# Patient Record
Sex: MALE | Race: WHITE | NOT HISPANIC OR LATINO | Employment: UNEMPLOYED | URBAN - METROPOLITAN AREA
[De-identification: names, ages, dates, MRNs, and addresses within clinical notes are randomized per-mention and may not be internally consistent; named-entity substitution may affect disease eponyms.]

---

## 2021-11-26 ENCOUNTER — HOSPITAL ENCOUNTER (EMERGENCY)
Facility: HOSPITAL | Age: 31
Discharge: HOME | End: 2021-11-26
Attending: EMERGENCY MEDICINE
Payer: OTHER GOVERNMENT

## 2021-11-26 ENCOUNTER — APPOINTMENT (EMERGENCY)
Dept: RADIOLOGY | Facility: HOSPITAL | Age: 31
End: 2021-11-26
Payer: OTHER GOVERNMENT

## 2021-11-26 VITALS
BODY MASS INDEX: 25.8 KG/M2 | OXYGEN SATURATION: 96 % | RESPIRATION RATE: 18 BRPM | TEMPERATURE: 96 F | HEART RATE: 67 BPM | SYSTOLIC BLOOD PRESSURE: 126 MMHG | HEIGHT: 71 IN | DIASTOLIC BLOOD PRESSURE: 70 MMHG

## 2021-11-26 DIAGNOSIS — S61.419A HAND LACERATION: Primary | ICD-10-CM

## 2021-11-26 PROCEDURE — 99283 EMERGENCY DEPT VISIT LOW MDM: CPT

## 2021-11-26 PROCEDURE — 99284 EMERGENCY DEPT VISIT MOD MDM: CPT | Performed by: EMERGENCY MEDICINE

## 2021-11-26 PROCEDURE — 73130 X-RAY EXAM OF HAND: CPT

## 2021-11-26 PROCEDURE — 90471 IMMUNIZATION ADMIN: CPT

## 2021-11-26 PROCEDURE — 90715 TDAP VACCINE 7 YRS/> IM: CPT | Performed by: EMERGENCY MEDICINE

## 2021-11-26 RX ORDER — AMOXICILLIN AND CLAVULANATE POTASSIUM 875; 125 MG/1; MG/1
1 TABLET, FILM COATED ORAL ONCE
Status: COMPLETED | OUTPATIENT
Start: 2021-11-26 | End: 2021-11-26

## 2021-11-26 RX ORDER — GINSENG 100 MG
1 CAPSULE ORAL 2 TIMES DAILY
Qty: 28 G | Refills: 0 | Status: SHIPPED | OUTPATIENT
Start: 2021-11-26

## 2021-11-26 RX ORDER — GINSENG 100 MG
1 CAPSULE ORAL ONCE
Status: COMPLETED | OUTPATIENT
Start: 2021-11-26 | End: 2021-11-26

## 2021-11-26 RX ORDER — LIDOCAINE HYDROCHLORIDE 10 MG/ML
10 INJECTION, SOLUTION EPIDURAL; INFILTRATION; INTRACAUDAL; PERINEURAL ONCE
Status: COMPLETED | OUTPATIENT
Start: 2021-11-26 | End: 2021-11-26

## 2021-11-26 RX ORDER — AMOXICILLIN AND CLAVULANATE POTASSIUM 875; 125 MG/1; MG/1
1 TABLET, FILM COATED ORAL EVERY 12 HOURS
Qty: 10 TABLET | Refills: 0 | Status: SHIPPED | OUTPATIENT
Start: 2021-11-26 | End: 2021-12-01

## 2021-11-26 RX ADMIN — LIDOCAINE HYDROCHLORIDE 10 ML: 10 INJECTION, SOLUTION EPIDURAL; INFILTRATION; INTRACAUDAL at 13:56

## 2021-11-26 RX ADMIN — AMOXICILLIN AND CLAVULANATE POTASSIUM 1 TABLET: 875; 125 TABLET, FILM COATED ORAL at 13:44

## 2021-11-26 RX ADMIN — TETANUS TOXOID, REDUCED DIPHTHERIA TOXOID AND ACELLULAR PERTUSSIS VACCINE, ADSORBED 0.5 ML: 5; 2.5; 8; 8; 2.5 SUSPENSION INTRAMUSCULAR at 13:44

## 2021-11-26 RX ADMIN — BACITRACIN 1 SMALL APPLICATION: 500 OINTMENT TOPICAL at 14:49

## 2023-06-10 ENCOUNTER — APPOINTMENT (INPATIENT)
Dept: RADIOLOGY | Facility: HOSPITAL | Age: 33
DRG: 720 | End: 2023-06-10
Payer: COMMERCIAL

## 2023-06-10 ENCOUNTER — APPOINTMENT (INPATIENT)
Dept: NON INVASIVE DIAGNOSTICS | Facility: HOSPITAL | Age: 33
DRG: 720 | End: 2023-06-10
Payer: COMMERCIAL

## 2023-06-10 ENCOUNTER — HOSPITAL ENCOUNTER (INPATIENT)
Facility: HOSPITAL | Age: 33
LOS: 4 days | Discharge: HOME | DRG: 720 | End: 2023-06-14
Attending: EMERGENCY MEDICINE | Admitting: INTERNAL MEDICINE
Payer: COMMERCIAL

## 2023-06-10 ENCOUNTER — APPOINTMENT (EMERGENCY)
Dept: RADIOLOGY | Facility: HOSPITAL | Age: 33
DRG: 720 | End: 2023-06-10
Payer: COMMERCIAL

## 2023-06-10 DIAGNOSIS — I21.4 NSTEMI (NON-ST ELEVATED MYOCARDIAL INFARCTION) (HCC): ICD-10-CM

## 2023-06-10 DIAGNOSIS — F15.10 METHAMPHETAMINE ABUSE (HCC): Primary | ICD-10-CM

## 2023-06-10 DIAGNOSIS — R07.9 CHEST PAIN, UNSPECIFIED TYPE: ICD-10-CM

## 2023-06-10 DIAGNOSIS — E87.8 ELECTROLYTE ABNORMALITY: ICD-10-CM

## 2023-06-10 DIAGNOSIS — R10.9 ABDOMINAL PAIN: ICD-10-CM

## 2023-06-10 DIAGNOSIS — F41.9 ANXIETY: ICD-10-CM

## 2023-06-10 PROBLEM — R73.09 ELEVATED GLUCOSE: Status: ACTIVE | Noted: 2023-06-10

## 2023-06-10 PROBLEM — A41.9 SEPSIS (HCC): Status: ACTIVE | Noted: 2023-06-10

## 2023-06-10 PROBLEM — R74.8 ELEVATED CK: Status: ACTIVE | Noted: 2023-06-10

## 2023-06-10 PROBLEM — K82.8 THICKENING OF WALL OF GALLBLADDER WITH PERICHOLECYSTIC FLUID: Status: ACTIVE | Noted: 2023-06-10

## 2023-06-10 PROBLEM — E87.29 METABOLIC ACIDOSIS, INCREASED ANION GAP (IAG): Status: ACTIVE | Noted: 2023-06-10

## 2023-06-10 PROBLEM — R79.89 ELEVATED SERUM CREATININE: Status: ACTIVE | Noted: 2023-06-10

## 2023-06-10 PROBLEM — R10.84 GENERALIZED ABDOMINAL PAIN: Status: ACTIVE | Noted: 2023-06-10

## 2023-06-10 LAB
2HR DELTA HS TROPONIN: 676 NG/L
4HR DELTA HS TROPONIN: 1551 NG/L
ALBUMIN SERPL BCP-MCNC: 5.1 G/DL (ref 3.5–5)
ALP SERPL-CCNC: 42 U/L (ref 34–104)
ALT SERPL W P-5'-P-CCNC: 31 U/L (ref 7–52)
AMPHETAMINES SERPL QL SCN: POSITIVE
ANION GAP SERPL CALCULATED.3IONS-SCNC: 18 MMOL/L (ref 4–13)
AORTIC ROOT: 3.2 CM
APICAL FOUR CHAMBER EJECTION FRACTION: 50 %
APTT PPP: 28 SECONDS (ref 23–37)
APTT PPP: 35 SECONDS (ref 23–37)
APTT PPP: 59 SECONDS (ref 23–37)
AST SERPL W P-5'-P-CCNC: 45 U/L (ref 13–39)
BACTERIA UR QL AUTO: NORMAL /HPF
BARBITURATES UR QL: NEGATIVE
BASOPHILS # BLD AUTO: 0.04 THOUSANDS/ÂΜL (ref 0–0.1)
BASOPHILS NFR BLD AUTO: 0 % (ref 0–1)
BENZODIAZ UR QL: NEGATIVE
BETA-HYDROXYBUTYRATE: 0.1 MMOL/L
BILIRUB DIRECT SERPL-MCNC: 0.14 MG/DL (ref 0–0.2)
BILIRUB SERPL-MCNC: 0.72 MG/DL (ref 0.2–1)
BILIRUB UR QL STRIP: NEGATIVE
BNP SERPL-MCNC: 330 PG/ML (ref 0–100)
BUN SERPL-MCNC: 25 MG/DL (ref 5–25)
CALCIUM SERPL-MCNC: 9.8 MG/DL (ref 8.4–10.2)
CARDIAC TROPONIN I PNL SERPL HS: 1219 NG/L
CARDIAC TROPONIN I PNL SERPL HS: 2094 NG/L
CARDIAC TROPONIN I PNL SERPL HS: 543 NG/L
CHLORIDE SERPL-SCNC: 95 MMOL/L (ref 96–108)
CHOLEST SERPL-MCNC: 159 MG/DL
CK SERPL-CCNC: 989 U/L (ref 39–308)
CLARITY UR: CLEAR
CO2 SERPL-SCNC: 19 MMOL/L (ref 21–32)
COCAINE UR QL: NEGATIVE
COLOR UR: YELLOW
CREAT SERPL-MCNC: 1.33 MG/DL (ref 0.6–1.3)
D DIMER PPP FEU-MCNC: 0.51 UG/ML FEU
E WAVE DECELERATION TIME: 211 MS
EOSINOPHIL # BLD AUTO: 0.01 THOUSAND/ÂΜL (ref 0–0.61)
EOSINOPHIL NFR BLD AUTO: 0 % (ref 0–6)
ERYTHROCYTE [DISTWIDTH] IN BLOOD BY AUTOMATED COUNT: 11.9 % (ref 11.6–15.1)
ETHANOL SERPL-MCNC: <10 MG/DL
FRACTIONAL SHORTENING: 34 (ref 28–44)
GFR SERPL CREATININE-BSD FRML MDRD: 69 ML/MIN/1.73SQ M
GLUCOSE SERPL-MCNC: 156 MG/DL (ref 65–140)
GLUCOSE UR STRIP-MCNC: NEGATIVE MG/DL
HCT VFR BLD AUTO: 43.5 % (ref 36.5–49.3)
HDLC SERPL-MCNC: 62 MG/DL
HGB BLD-MCNC: 15.4 G/DL (ref 12–17)
HGB UR QL STRIP.AUTO: NEGATIVE
IMM GRANULOCYTES # BLD AUTO: 0.05 THOUSAND/UL (ref 0–0.2)
IMM GRANULOCYTES NFR BLD AUTO: 0 % (ref 0–2)
INR PPP: 1.13 (ref 0.84–1.19)
INTERVENTRICULAR SEPTUM IN DIASTOLE (PARASTERNAL SHORT AXIS VIEW): 0.9 CM
INTERVENTRICULAR SEPTUM: 0.9 CM (ref 0.6–1.1)
KETONES UR STRIP-MCNC: ABNORMAL MG/DL
LAAS-AP2: 15.2 CM2
LAAS-AP4: 13.3 CM2
LACTATE SERPL-SCNC: 1.4 MMOL/L (ref 0.5–2)
LACTATE SERPL-SCNC: 3.8 MMOL/L (ref 0.5–2)
LDLC SERPL CALC-MCNC: 84 MG/DL (ref 0–100)
LEFT ATRIUM SIZE: 3.6 CM
LEFT INTERNAL DIMENSION IN SYSTOLE: 3.5 CM (ref 2.1–4)
LEFT VENTRICULAR INTERNAL DIMENSION IN DIASTOLE: 5.3 CM (ref 3.5–6)
LEFT VENTRICULAR POSTERIOR WALL IN END DIASTOLE: 0.9 CM
LEFT VENTRICULAR STROKE VOLUME: 81 ML
LEUKOCYTE ESTERASE UR QL STRIP: NEGATIVE
LVSV (TEICH): 81 ML
LYMPHOCYTES # BLD AUTO: 1.31 THOUSANDS/ÂΜL (ref 0.6–4.47)
LYMPHOCYTES NFR BLD AUTO: 7 % (ref 14–44)
MAGNESIUM SERPL-MCNC: 1.7 MG/DL (ref 1.9–2.7)
MCH RBC QN AUTO: 31.3 PG (ref 26.8–34.3)
MCHC RBC AUTO-ENTMCNC: 35.4 G/DL (ref 31.4–37.4)
MCV RBC AUTO: 88 FL (ref 82–98)
METHADONE UR QL: NEGATIVE
MONOCYTES # BLD AUTO: 0.96 THOUSAND/ÂΜL (ref 0.17–1.22)
MONOCYTES NFR BLD AUTO: 5 % (ref 4–12)
MV E'TISSUE VEL-SEP: 14 CM/S
MV PEAK A VEL: 0.51 M/S
MV PEAK E VEL: 145 CM/S
MV STENOSIS PRESSURE HALF TIME: 61 MS
MV VALVE AREA P 1/2 METHOD: 3.61
NEUTROPHILS # BLD AUTO: 16.47 THOUSANDS/ÂΜL (ref 1.85–7.62)
NEUTS SEG NFR BLD AUTO: 88 % (ref 43–75)
NITRITE UR QL STRIP: NEGATIVE
NON-SQ EPI CELLS URNS QL MICRO: NORMAL /HPF
NRBC BLD AUTO-RTO: 0 /100 WBCS
OPIATES UR QL SCN: NEGATIVE
OXYCODONE+OXYMORPHONE UR QL SCN: NEGATIVE
PCP UR QL: NEGATIVE
PH UR STRIP.AUTO: 7 [PH]
PLATELET # BLD AUTO: 227 THOUSANDS/UL (ref 149–390)
PMV BLD AUTO: 11.6 FL (ref 8.9–12.7)
POTASSIUM SERPL-SCNC: 2.9 MMOL/L (ref 3.5–5.3)
PROCALCITONIN SERPL-MCNC: <0.05 NG/ML
PROT SERPL-MCNC: 8.1 G/DL (ref 6.4–8.4)
PROT UR STRIP-MCNC: ABNORMAL MG/DL
PROTHROMBIN TIME: 14.6 SECONDS (ref 11.6–14.5)
RBC # BLD AUTO: 4.92 MILLION/UL (ref 3.88–5.62)
RBC #/AREA URNS AUTO: NORMAL /HPF
RIGHT ATRIUM AREA SYSTOLE A4C: 20.8 CM2
RIGHT VENTRICLE ID DIMENSION: 4.7 CM
SL CV LEFT ATRIUM LENGTH A2C: 4.3 CM
SL CV PED ECHO LEFT VENTRICLE DIASTOLIC VOLUME (MOD BIPLANE) 2D: 133 ML
SL CV PED ECHO LEFT VENTRICLE SYSTOLIC VOLUME (MOD BIPLANE) 2D: 52 ML
SODIUM SERPL-SCNC: 132 MMOL/L (ref 135–147)
SP GR UR STRIP.AUTO: 1.01 (ref 1–1.03)
THC UR QL: POSITIVE
TRICUSPID ANNULAR PLANE SYSTOLIC EXCURSION: 3.1 CM
TRIGL SERPL-MCNC: 66 MG/DL
UROBILINOGEN UR QL STRIP.AUTO: 0.2 E.U./DL
WBC # BLD AUTO: 18.84 THOUSAND/UL (ref 4.31–10.16)
WBC #/AREA URNS AUTO: NORMAL /HPF

## 2023-06-10 PROCEDURE — 96361 HYDRATE IV INFUSION ADD-ON: CPT

## 2023-06-10 PROCEDURE — 80307 DRUG TEST PRSMV CHEM ANLYZR: CPT | Performed by: EMERGENCY MEDICINE

## 2023-06-10 PROCEDURE — 82010 KETONE BODYS QUAN: CPT | Performed by: STUDENT IN AN ORGANIZED HEALTH CARE EDUCATION/TRAINING PROGRAM

## 2023-06-10 PROCEDURE — 83880 ASSAY OF NATRIURETIC PEPTIDE: CPT | Performed by: STUDENT IN AN ORGANIZED HEALTH CARE EDUCATION/TRAINING PROGRAM

## 2023-06-10 PROCEDURE — 99222 1ST HOSP IP/OBS MODERATE 55: CPT | Performed by: INTERNAL MEDICINE

## 2023-06-10 PROCEDURE — 85379 FIBRIN DEGRADATION QUANT: CPT | Performed by: EMERGENCY MEDICINE

## 2023-06-10 PROCEDURE — 99222 1ST HOSP IP/OBS MODERATE 55: CPT | Performed by: STUDENT IN AN ORGANIZED HEALTH CARE EDUCATION/TRAINING PROGRAM

## 2023-06-10 PROCEDURE — G1004 CDSM NDSC: HCPCS

## 2023-06-10 PROCEDURE — 85730 THROMBOPLASTIN TIME PARTIAL: CPT | Performed by: STUDENT IN AN ORGANIZED HEALTH CARE EDUCATION/TRAINING PROGRAM

## 2023-06-10 PROCEDURE — 80061 LIPID PANEL: CPT | Performed by: STUDENT IN AN ORGANIZED HEALTH CARE EDUCATION/TRAINING PROGRAM

## 2023-06-10 PROCEDURE — 83605 ASSAY OF LACTIC ACID: CPT | Performed by: STUDENT IN AN ORGANIZED HEALTH CARE EDUCATION/TRAINING PROGRAM

## 2023-06-10 PROCEDURE — 71275 CT ANGIOGRAPHY CHEST: CPT

## 2023-06-10 PROCEDURE — 36415 COLL VENOUS BLD VENIPUNCTURE: CPT | Performed by: EMERGENCY MEDICINE

## 2023-06-10 PROCEDURE — 93005 ELECTROCARDIOGRAM TRACING: CPT

## 2023-06-10 PROCEDURE — 82077 ASSAY SPEC XCP UR&BREATH IA: CPT | Performed by: STUDENT IN AN ORGANIZED HEALTH CARE EDUCATION/TRAINING PROGRAM

## 2023-06-10 PROCEDURE — 83036 HEMOGLOBIN GLYCOSYLATED A1C: CPT | Performed by: STUDENT IN AN ORGANIZED HEALTH CARE EDUCATION/TRAINING PROGRAM

## 2023-06-10 PROCEDURE — 96374 THER/PROPH/DIAG INJ IV PUSH: CPT

## 2023-06-10 PROCEDURE — 74177 CT ABD & PELVIS W/CONTRAST: CPT

## 2023-06-10 PROCEDURE — 81001 URINALYSIS AUTO W/SCOPE: CPT | Performed by: EMERGENCY MEDICINE

## 2023-06-10 PROCEDURE — 99285 EMERGENCY DEPT VISIT HI MDM: CPT

## 2023-06-10 PROCEDURE — 85025 COMPLETE CBC W/AUTO DIFF WBC: CPT | Performed by: EMERGENCY MEDICINE

## 2023-06-10 PROCEDURE — 87040 BLOOD CULTURE FOR BACTERIA: CPT | Performed by: STUDENT IN AN ORGANIZED HEALTH CARE EDUCATION/TRAINING PROGRAM

## 2023-06-10 PROCEDURE — 83735 ASSAY OF MAGNESIUM: CPT | Performed by: NURSE PRACTITIONER

## 2023-06-10 PROCEDURE — 84484 ASSAY OF TROPONIN QUANT: CPT | Performed by: EMERGENCY MEDICINE

## 2023-06-10 PROCEDURE — 82550 ASSAY OF CK (CPK): CPT | Performed by: EMERGENCY MEDICINE

## 2023-06-10 PROCEDURE — 84145 PROCALCITONIN (PCT): CPT | Performed by: STUDENT IN AN ORGANIZED HEALTH CARE EDUCATION/TRAINING PROGRAM

## 2023-06-10 PROCEDURE — 74174 CTA ABD&PLVS W/CONTRAST: CPT

## 2023-06-10 PROCEDURE — 96375 TX/PRO/DX INJ NEW DRUG ADDON: CPT

## 2023-06-10 PROCEDURE — 85730 THROMBOPLASTIN TIME PARTIAL: CPT | Performed by: EMERGENCY MEDICINE

## 2023-06-10 PROCEDURE — 93306 TTE W/DOPPLER COMPLETE: CPT | Performed by: INTERNAL MEDICINE

## 2023-06-10 PROCEDURE — 80074 ACUTE HEPATITIS PANEL: CPT | Performed by: STUDENT IN AN ORGANIZED HEALTH CARE EDUCATION/TRAINING PROGRAM

## 2023-06-10 PROCEDURE — 71045 X-RAY EXAM CHEST 1 VIEW: CPT

## 2023-06-10 PROCEDURE — 93306 TTE W/DOPPLER COMPLETE: CPT

## 2023-06-10 PROCEDURE — 85610 PROTHROMBIN TIME: CPT | Performed by: EMERGENCY MEDICINE

## 2023-06-10 PROCEDURE — 80076 HEPATIC FUNCTION PANEL: CPT | Performed by: NURSE PRACTITIONER

## 2023-06-10 PROCEDURE — 76705 ECHO EXAM OF ABDOMEN: CPT

## 2023-06-10 PROCEDURE — 80048 BASIC METABOLIC PNL TOTAL CA: CPT | Performed by: EMERGENCY MEDICINE

## 2023-06-10 RX ORDER — KETOROLAC TROMETHAMINE 30 MG/ML
15 INJECTION, SOLUTION INTRAMUSCULAR; INTRAVENOUS EVERY 6 HOURS PRN
Status: DISPENSED | OUTPATIENT
Start: 2023-06-10 | End: 2023-06-12

## 2023-06-10 RX ORDER — ASPIRIN 81 MG/1
324 TABLET, CHEWABLE ORAL ONCE
Status: COMPLETED | OUTPATIENT
Start: 2023-06-10 | End: 2023-06-10

## 2023-06-10 RX ORDER — POTASSIUM CHLORIDE 14.9 MG/ML
20 INJECTION INTRAVENOUS ONCE
Status: COMPLETED | OUTPATIENT
Start: 2023-06-10 | End: 2023-06-10

## 2023-06-10 RX ORDER — HEPARIN SODIUM 1000 [USP'U]/ML
2000 INJECTION, SOLUTION INTRAVENOUS; SUBCUTANEOUS EVERY 6 HOURS PRN
Status: DISCONTINUED | OUTPATIENT
Start: 2023-06-10 | End: 2023-06-13

## 2023-06-10 RX ORDER — LANOLIN ALCOHOL/MO/W.PET/CERES
400 CREAM (GRAM) TOPICAL 2 TIMES DAILY
Status: DISCONTINUED | OUTPATIENT
Start: 2023-06-10 | End: 2023-06-14

## 2023-06-10 RX ORDER — ATORVASTATIN CALCIUM 80 MG/1
80 TABLET, FILM COATED ORAL EVERY EVENING
Status: DISCONTINUED | OUTPATIENT
Start: 2023-06-10 | End: 2023-06-14

## 2023-06-10 RX ORDER — HEPARIN SODIUM 1000 [USP'U]/ML
4000 INJECTION, SOLUTION INTRAVENOUS; SUBCUTANEOUS ONCE
Status: COMPLETED | OUTPATIENT
Start: 2023-06-10 | End: 2023-06-10

## 2023-06-10 RX ORDER — HEPARIN SODIUM 10000 [USP'U]/100ML
3-20 INJECTION, SOLUTION INTRAVENOUS
Status: DISCONTINUED | OUTPATIENT
Start: 2023-06-10 | End: 2023-06-13

## 2023-06-10 RX ORDER — MIDAZOLAM HYDROCHLORIDE 2 MG/2ML
1 INJECTION, SOLUTION INTRAMUSCULAR; INTRAVENOUS EVERY 4 HOURS PRN
Status: DISCONTINUED | OUTPATIENT
Start: 2023-06-10 | End: 2023-06-10

## 2023-06-10 RX ORDER — MIDAZOLAM HYDROCHLORIDE 2 MG/2ML
2 INJECTION, SOLUTION INTRAMUSCULAR; INTRAVENOUS ONCE
Status: COMPLETED | OUTPATIENT
Start: 2023-06-10 | End: 2023-06-10

## 2023-06-10 RX ORDER — PANTOPRAZOLE SODIUM 40 MG/1
40 TABLET, DELAYED RELEASE ORAL
Status: DISCONTINUED | OUTPATIENT
Start: 2023-06-11 | End: 2023-06-14

## 2023-06-10 RX ORDER — HEPARIN SODIUM 1000 [USP'U]/ML
4000 INJECTION, SOLUTION INTRAVENOUS; SUBCUTANEOUS EVERY 6 HOURS PRN
Status: DISCONTINUED | OUTPATIENT
Start: 2023-06-10 | End: 2023-06-13

## 2023-06-10 RX ORDER — SODIUM CHLORIDE 9 MG/ML
125 INJECTION, SOLUTION INTRAVENOUS CONTINUOUS
Status: DISCONTINUED | OUTPATIENT
Start: 2023-06-10 | End: 2023-06-13

## 2023-06-10 RX ORDER — LABETALOL HYDROCHLORIDE 5 MG/ML
10 INJECTION, SOLUTION INTRAVENOUS ONCE
Status: DISCONTINUED | OUTPATIENT
Start: 2023-06-10 | End: 2023-06-10

## 2023-06-10 RX ORDER — POLYETHYLENE GLYCOL 3350 17 G/17G
17 POWDER, FOR SOLUTION ORAL DAILY
Status: DISCONTINUED | OUTPATIENT
Start: 2023-06-10 | End: 2023-06-14 | Stop reason: HOSPADM

## 2023-06-10 RX ORDER — POTASSIUM CHLORIDE 20 MEQ/1
20 TABLET, EXTENDED RELEASE ORAL DAILY
Status: DISCONTINUED | OUTPATIENT
Start: 2023-06-11 | End: 2023-06-13

## 2023-06-10 RX ORDER — POTASSIUM CHLORIDE 29.8 MG/ML
40 INJECTION INTRAVENOUS ONCE
Status: DISCONTINUED | OUTPATIENT
Start: 2023-06-10 | End: 2023-06-10

## 2023-06-10 RX ORDER — SODIUM CHLORIDE, SODIUM GLUCONATE, SODIUM ACETATE, POTASSIUM CHLORIDE, MAGNESIUM CHLORIDE, SODIUM PHOSPHATE, DIBASIC, AND POTASSIUM PHOSPHATE .53; .5; .37; .037; .03; .012; .00082 G/100ML; G/100ML; G/100ML; G/100ML; G/100ML; G/100ML; G/100ML
125 INJECTION, SOLUTION INTRAVENOUS CONTINUOUS
Status: DISCONTINUED | OUTPATIENT
Start: 2023-06-10 | End: 2023-06-10

## 2023-06-10 RX ORDER — ACETAMINOPHEN 325 MG/1
650 TABLET ORAL EVERY 6 HOURS PRN
Status: DISCONTINUED | OUTPATIENT
Start: 2023-06-10 | End: 2023-06-10

## 2023-06-10 RX ORDER — METRONIDAZOLE 500 MG/100ML
500 INJECTION, SOLUTION INTRAVENOUS EVERY 8 HOURS
Status: DISCONTINUED | OUTPATIENT
Start: 2023-06-10 | End: 2023-06-12

## 2023-06-10 RX ORDER — NITROGLYCERIN 0.4 MG/1
0.4 TABLET SUBLINGUAL
Status: DISCONTINUED | OUTPATIENT
Start: 2023-06-10 | End: 2023-06-13

## 2023-06-10 RX ORDER — CEFTRIAXONE 1 G/50ML
1000 INJECTION, SOLUTION INTRAVENOUS EVERY 24 HOURS
Status: DISCONTINUED | OUTPATIENT
Start: 2023-06-10 | End: 2023-06-13

## 2023-06-10 RX ORDER — ONDANSETRON 2 MG/ML
4 INJECTION INTRAMUSCULAR; INTRAVENOUS EVERY 6 HOURS PRN
Status: DISCONTINUED | OUTPATIENT
Start: 2023-06-10 | End: 2023-06-14 | Stop reason: HOSPADM

## 2023-06-10 RX ORDER — ASPIRIN 81 MG/1
81 TABLET, CHEWABLE ORAL DAILY
Status: DISCONTINUED | OUTPATIENT
Start: 2023-06-11 | End: 2023-06-14

## 2023-06-10 RX ORDER — FAMOTIDINE 10 MG/ML
20 INJECTION, SOLUTION INTRAVENOUS EVERY 12 HOURS SCHEDULED
Status: DISCONTINUED | OUTPATIENT
Start: 2023-06-10 | End: 2023-06-11

## 2023-06-10 RX ORDER — HYDROXYZINE HYDROCHLORIDE 25 MG/1
25 TABLET, FILM COATED ORAL EVERY 6 HOURS PRN
Status: DISCONTINUED | OUTPATIENT
Start: 2023-06-10 | End: 2023-06-14 | Stop reason: HOSPADM

## 2023-06-10 RX ADMIN — ASPIRIN 81 MG CHEWABLE TABLET 324 MG: 81 TABLET CHEWABLE at 03:30

## 2023-06-10 RX ADMIN — MIDAZOLAM 1 MG: 1 INJECTION INTRAMUSCULAR; INTRAVENOUS at 14:46

## 2023-06-10 RX ADMIN — HEPARIN SODIUM 12 UNITS/KG/HR: 10000 INJECTION, SOLUTION INTRAVENOUS at 06:06

## 2023-06-10 RX ADMIN — FAMOTIDINE 20 MG: 10 INJECTION, SOLUTION INTRAVENOUS at 21:38

## 2023-06-10 RX ADMIN — METRONIDAZOLE 500 MG: 500 INJECTION, SOLUTION INTRAVENOUS at 08:44

## 2023-06-10 RX ADMIN — DILTIAZEM HYDROCHLORIDE 30 MG: 30 TABLET, FILM COATED ORAL at 21:37

## 2023-06-10 RX ADMIN — MAGNESIUM OXIDE TAB 400 MG (241.3 MG ELEMENTAL MG) 400 MG: 400 (241.3 MG) TAB at 18:50

## 2023-06-10 RX ADMIN — SODIUM CHLORIDE 1000 ML: 0.9 INJECTION, SOLUTION INTRAVENOUS at 02:39

## 2023-06-10 RX ADMIN — ATORVASTATIN CALCIUM 80 MG: 80 TABLET, FILM COATED ORAL at 17:24

## 2023-06-10 RX ADMIN — POTASSIUM CHLORIDE 20 MEQ: 14.9 INJECTION, SOLUTION INTRAVENOUS at 08:33

## 2023-06-10 RX ADMIN — NITROGLYCERIN 1 INCH: 20 OINTMENT TOPICAL at 03:31

## 2023-06-10 RX ADMIN — SODIUM CHLORIDE, SODIUM GLUCONATE, SODIUM ACETATE, POTASSIUM CHLORIDE, MAGNESIUM CHLORIDE, SODIUM PHOSPHATE, DIBASIC, AND POTASSIUM PHOSPHATE 125 ML/HR: .53; .5; .37; .037; .03; .012; .00082 INJECTION, SOLUTION INTRAVENOUS at 08:33

## 2023-06-10 RX ADMIN — POTASSIUM CHLORIDE 20 MEQ: 14.9 INJECTION, SOLUTION INTRAVENOUS at 10:38

## 2023-06-10 RX ADMIN — IOHEXOL 100 ML: 350 INJECTION, SOLUTION INTRAVENOUS at 13:23

## 2023-06-10 RX ADMIN — NITROGLYCERIN 0.4 MG: 0.4 TABLET SUBLINGUAL at 14:26

## 2023-06-10 RX ADMIN — DILTIAZEM HYDROCHLORIDE 30 MG: 30 TABLET, FILM COATED ORAL at 13:26

## 2023-06-10 RX ADMIN — HEPARIN SODIUM 4000 UNITS: 1000 INJECTION INTRAVENOUS; SUBCUTANEOUS at 15:31

## 2023-06-10 RX ADMIN — MIDAZOLAM 2 MG: 1 INJECTION INTRAMUSCULAR; INTRAVENOUS at 03:33

## 2023-06-10 RX ADMIN — POLYETHYLENE GLYCOL 3350 17 G: 17 POWDER, FOR SOLUTION ORAL at 10:02

## 2023-06-10 RX ADMIN — HEPARIN SODIUM 2000 UNITS: 1000 INJECTION INTRAVENOUS; SUBCUTANEOUS at 22:14

## 2023-06-10 RX ADMIN — IOHEXOL 100 ML: 350 INJECTION, SOLUTION INTRAVENOUS at 05:41

## 2023-06-10 RX ADMIN — FAMOTIDINE 20 MG: 10 INJECTION, SOLUTION INTRAVENOUS at 08:42

## 2023-06-10 RX ADMIN — CEFTRIAXONE 1000 MG: 1 INJECTION, SOLUTION INTRAVENOUS at 06:50

## 2023-06-10 RX ADMIN — HEPARIN SODIUM 4000 UNITS: 1000 INJECTION INTRAVENOUS; SUBCUTANEOUS at 05:58

## 2023-06-10 RX ADMIN — METRONIDAZOLE 500 MG: 500 INJECTION, SOLUTION INTRAVENOUS at 16:47

## 2023-06-10 NOTE — ASSESSMENT & PLAN NOTE
Evidenced by tachypnea, leukocytosis and possible colitis    · CT abdomen:  · Diffuse gallbladder wall thickening and pericholecystic edema noted without calcified gallstones  Recommend RUQ US for better evaluation  No significant intrahepatic or extrahepatic biliary ductal dilatation  2   Subtle diffuse wall thickening of the stomach, descending  colon, sigmoid colon, and rectum could be related to under  distention versus gastritis/colitis  Recommend correlation  3   No evidence for bowel obstruction, appendicitis, obstructive  uropathy, or free air  4   Small amount of nonspecific pathological free fluid in the  pelvis of uncertain etiology    · Blood cultures-pending  · Pro-[place]am/lactic acid-pending  · Will initiate empiric antibiotic Rocephin  · IVF  · Monitor SSX of infection

## 2023-06-10 NOTE — ED NOTES
Dr Gallo Martin at [facility], [address] [facility]  06/10/23 0105

## 2023-06-10 NOTE — PLAN OF CARE
Problem: Potential for Falls  Goal: Patient will remain free of falls  Description: INTERVENTIONS:  - Educate patient/family on patient safety including physical limitations  - Instruct patient to call for assistance with activity   - Consult OT/PT to assist with strengthening/mobility   - Keep Call bell within reach  - Keep bed low and locked with side rails adjusted as appropriate  - Keep care items and personal belongings within reach  - Initiate and maintain comfort rounds  - Make Fall Risk Sign visible to staff  - Offer Toileting every 2 Hours, in advance of need  - Initiate/Maintain bed alarm  - Obtain necessary fall risk management equipment: yellow socks   Problem: PAIN - ADULT  Goal: Verbalizes/displays adequate comfort level or baseline comfort level  Description: Interventions:  - Encourage patient to monitor pain and request assistance  - Assess pain using appropriate pain scale  - Administer analgesics based on type and severity of pain and evaluate response  - Implement non-pharmacological measures as appropriate and evaluate response  - Consider cultural and social influences on pain and pain management  - Notify physician/advanced practitioner if interventions unsuccessful or patient reports new pain  Outcome: Progressing     Problem: INFECTION - ADULT  Goal: Absence or prevention of progression during hospitalization  Description: INTERVENTIONS:  - Assess and monitor for signs and symptoms of infection  - Monitor lab/diagnostic results  - Monitor all insertion sites, i e  indwelling lines, tubes, and drains  - Monitor endotracheal if appropriate and nasal secretions for changes in amount and color  - [place] appropriate cooling/warming therapies per order  - Administer medications as ordered  - Instruct and encourage patient and family to use good hand hygiene technique  - Identify and instruct in appropriate isolation precautions for identified infection/condition  Outcome: Progressing  Goal: Absence of fever/infection during neutropenic period  Description: INTERVENTIONS:  - Monitor WBC    Outcome: Progressing     - Apply yellow socks and bracelet for high fall risk patients  - Consider moving patient to room near nurses station  Outcome: Progressing

## 2023-06-10 NOTE — ASSESSMENT & PLAN NOTE
· K+-2 9, replenished in ED will continue  · NA-132/CL-95, continue IVF  · Mag-1 7, replenish and monitor  · Trend

## 2023-06-10 NOTE — ASSESSMENT & PLAN NOTE
ACS R/o    Low risk:   /   DOLORES Risk Score    Flowsheet Row Most Recent Value   Age >= 72 0 Filed at: 06/10/2023 4921   Known CAD (stenosis >= 50%) 0 Filed at: 06/10/2023 2090   Recent (<=24 hrs) Service Angina 1 Filed at: 06/10/2023 0921   ST Deviation >= 0 5 mm 0 Filed at: 06/10/2023 2467   3+ CAD Risk Factors (FHx, HTN, HLP, DM, Smoker) 0 Filed at: 06/10/2023 0152   Aspirin Use Past 7 Days 0 Filed at: 06/10/2023 6787   Elevated Cardiac Markers 1 Filed at: 06/10/2023 0774   DOLORES Risk Score (Calculated) 2 Filed at: 06/10/2023 8413      •   • EKG: Sinus bradycardia  • CXR-No acute cardiopulmonary disease  Serial Troponin I  trending   Likely type 2 MI secondary Meth use vs ACS  Echo: Pending  Telemetry  • CBC, CMP- trending   • BNP-pending  • [id number]%,   • Lipid Panel-pending  • Telemetry in place  • Meds: As needed morphine, nitrates, supplemental oxygen  • Cardiology recs:  o Continue aspirin, heparin drip, atorvastatin  o Replace potassium and magnesium  o Follow-up electrolytes, creatinine, potassium and magnesium  o Repeat troponin in a m   o Calcium channel blocker  o Consider CTA of chest and abdomen to rule out dissection

## 2023-06-10 NOTE — ASSESSMENT & PLAN NOTE
PTA with symptoms of chest pain    · UDS-congruent with methamphetamine and THC  · Counseled on cessation  · Contraindicated beta-blockers for symptomatic management

## 2023-06-10 NOTE — ASSESSMENT & PLAN NOTE
· CT abdomen:   · Diffuse gallbladder wall thickening and pericholecystic edema noted without calcified gallstones  Recommend right upper quadrant ultrasound for better evaluation  No significant intrahepatic or extrahepatic biliary ductal dilatation  · 2  Subtle diffuse wall thickening of the stomach, descending colon, sigmoid colon, and rectum could be related to under distention versus gastritis/colitis  · RUQ ultrasound-pending  · Consider GI/surgical consult if positive findings  · Empiric Rocephin  · Toradol for pain  · CTA dissection protocol:  · Trace amount of complex abnormal fluid in the pelvis for a male patient could relate to infectious/inflammatory process or other etiology, correlate clinically  No evidence of intramural hematoma, dissection or aneurysm  · Persistent diffuse gallbladder wall thickening as seen on ultrasound

## 2023-06-10 NOTE — PLAN OF CARE
Problem: Potential for Falls  Goal: Patient will remain free of falls  Description: INTERVENTIONS:  - Educate patient/family on patient safety including physical limitations  - Instruct patient to call for assistance with activity   - Consult OT/PT to assist with strengthening/mobility   - Keep Call bell within reach  - Keep bed low and locked with side rails adjusted as appropriate  - Keep care items and personal belongings within reach  - Initiate and maintain comfort rounds  - Make Fall Risk Sign visible to staff  - Offer Toileting every 2 Hours, in advance of need  - Initiate/Maintain bed alarm  - Obtain necessary fall risk management equipment: slipper socks  - Apply yellow socks and bracelet for high fall risk patients  - Consider moving patient to room near nurses station  Outcome: Progressing     Problem: CARDIOVASCULAR - ADULT  Goal: Maintains optimal cardiac output and hemodynamic stability  Description: INTERVENTIONS:  - Monitor I/O, vital signs and rhythm  - Monitor for S/S and trends of decreased cardiac output  - Administer and titrate ordered vasoactive medications to optimize hemodynamic stability  - Assess quality of pulses, skin color and temperature  - Assess for signs of decreased coronary artery perfusion  - Instruct patient to report change in severity of symptoms  Outcome: Progressing  Goal: Absence of cardiac dysrhythmias or at baseline rhythm  Description: INTERVENTIONS:  - Continuous cardiac monitoring, vital signs, obtain 12 lead EKG if ordered  - Administer antiarrhythmic and heart rate control medications as ordered  - Monitor electrolytes and administer replacement therapy as ordered  Outcome: Progressing     Problem: PAIN - ADULT  Goal: Verbalizes/displays adequate comfort level or baseline comfort level  Description: Interventions:  - Encourage patient to monitor pain and request assistance  - Assess pain using appropriate pain scale  - Administer analgesics based on type and severity of pain and evaluate response  - Implement non-pharmacological measures as appropriate and evaluate response  - Consider cultural and social influences on pain and pain management  - Notify physician/advanced practitioner if interventions unsuccessful or patient reports new pain  Outcome: Progressing     Problem: INFECTION - ADULT  Goal: Absence or prevention of progression during hospitalization  Description: INTERVENTIONS:  - Assess and monitor for signs and symptoms of infection  - Monitor lab/diagnostic results  - Monitor all insertion sites, i e  indwelling lines, tubes, and drains  - Monitor endotracheal if appropriate and nasal secretions for changes in amount and color  - [place] appropriate cooling/warming therapies per order  - Administer medications as ordered  - Instruct and encourage patient and family to use good hand hygiene technique  - Identify and instruct in appropriate isolation precautions for identified infection/condition  Outcome: Progressing  Goal: Absence of fever/infection during neutropenic period  Description: INTERVENTIONS:  - Monitor WBC    Outcome: Progressing     Problem: SAFETY ADULT  Goal: Patient will remain free of falls  Description: INTERVENTIONS:  - Educate patient/family on patient safety including physical limitations  - Instruct patient to call for assistance with activity   - Consult OT/PT to assist with strengthening/mobility   - Keep Call bell within reach  - Keep bed low and locked with side rails adjusted as appropriate  - Keep care items and personal belongings within reach  - Initiate and maintain comfort rounds  - Make Fall Risk Sign visible to staff  - Offer Toileting every 2 Hours, in advance of need  - Initiate/Maintain bed alarm  - Obtain necessary fall risk management equipment: slipper socks  - Apply yellow socks and bracelet for high fall risk patients  - Consider moving patient to room near nurses station  Outcome: Progressing  Goal: Maintain or return to baseline ADL function  Description: INTERVENTIONS:  -  Assess patient's ability to carry out ADLs; assess patient's baseline for ADL function and identify physical deficits which impact ability to perform ADLs (bathing, care of mouth/teeth, toileting, grooming, dressing, etc )  - Assess/evaluate cause of self-care deficits   - Assess range of motion  - Assess patient's mobility; develop plan if impaired  - Assess patient's need for assistive devices and provide as appropriate  - Encourage maximum independence but intervene and supervise when necessary  - Involve family in performance of ADLs  - Assess for home care needs following discharge   - Consider OT consult to assist with ADL evaluation and planning for discharge  - Provide patient education as appropriate  Outcome: Progressing  Goal: Maintains/Returns to pre admission functional level  Description: INTERVENTIONS:  - Perform BMAT or MOVE assessment daily    - Set and communicate daily mobility goal to care team and patient/family/caregiver  - Collaborate with rehabilitation services on mobility goals if consulted  - Perform Range of Motion 3 times a day  - Reposition patient every 2 hours    - Dangle patient 3 times a day  - Stand patient 3 times a day  - Ambulate patient 3 times a day  - Out of bed to chair 3 times a day   - Out of bed for meals 3 times a day  - Out of bed for toileting  - Record patient progress and toleration of activity level   Outcome: Progressing     Problem: DISCHARGE PLANNING  Goal: Discharge to home or other facility with appropriate resources  Description: INTERVENTIONS:  - Identify barriers to discharge w/patient and caregiver  - Arrange for needed discharge resources and transportation as appropriate  - Identify discharge learning needs (meds, wound care, etc )  - Arrange for interpretive services to assist at discharge as needed  - Refer to Case Management Department for coordinating discharge planning if the patient needs post-hospital services based on physician/advanced practitioner order or complex needs related to functional status, cognitive ability, or social support system  Outcome: Progressing

## 2023-06-10 NOTE — ASSESSMENT & PLAN NOTE
· AG-18  · LA- elevated likely due to underlying cell death from cardiac demand, repeat pending  · Beta hydroxybutyrate-pending  · Ethanol-pending  · Continue IVF

## 2023-06-10 NOTE — CONSULTS
Consultation - Cardiology   [facility]     Tamera Harding 27 y o  male MRN: [id number]  : 1990  Unit/Bed#: [address] [place] [address] Encounter: [id number]      ASSESSMENT:  Elevated cardiac troponin following methamphetamine abuse  Type II versus type I MI  Presented with chest pain which has improved  Complains of abdominal discomfort and bloating  No acute ischemic changes on EKG  Elevated at hsTNI, peak so far     History of methamphetamine and pot abuse  Hypokalemia and hypomagnesemia  Elevated creatinine of 1 33  Elevated AST          RECOMMENDATIONS:    Continue aspirin, IV heparin, atorvastatin  Replace potassium and magnesium  Follow-up electrolytes, creatinine, potassium and magnesium  Repeat troponin in a m  Calcium channel blocker  Consider CTA of chest and abdomen to rule out dissection      Thank you for consulting me in the management and care of this patient  Physician Requesting Consult: Aidan Gordon MD    Reason for Consult / Principal Problem: Elevated troponin    Inpatient consult to Cardiology  Consult performed by: Neil Tello MD  Consult ordered by: Aidan Gordon MD          HPI: Tamera Harding is a 27y o  year old male who presents with abdominal and chest pain following use of methamphetamine  His cardiac troponin has been increasing and at 4 hours it is   Patient states that his chest pain has decreased but he has abdominal discomfort and bloating  He had severe hypokalemia and hypomagnesemia  His electrolytes are being replaced    Review of Systems   Respiratory: Positive for shortness of breath  Cardiovascular: Positive for chest pain  Gastrointestinal: Positive for abdominal pain  All other systems reviewed and are negative  Historical Information   History reviewed  No pertinent past medical history  History reviewed  No pertinent surgical history    Social History     Substance and Sexual Activity   Alcohol Use Not "Currently    Comment: drinks occasional throughout week      Social History     Substance and Sexual Activity   Drug Use Yes   • Types: Marijuana, Methamphetamines    Comment: \"once in awile\"     Social History     Tobacco Use   Smoking Status Former   • Packs/day: 1 00   • Years: 10 00   • Total pack years: 10 00   • Types: Cigarettes   Smokeless Tobacco Not on file   Tobacco Comments    refused teaching      Family History:   Family History   Problem Relation Age of Onset   • No Known Problems Mother    • No Known Problems Father    • No Known Problems Sister    • No Known Problems Brother        Meds/Allergies    PTA meds:    Medications Prior to Admission   Medication   • bacitracin topical ointment 500 units/g topical ointment      No Known Allergies    Current Facility-Administered Medications:   •  acetaminophen (TYLENOL) tablet 650 mg, 650 mg, Oral, Q6H PRN, Kassi Espinoza MD  •  [START ON 6/11/2023] aspirin chewable tablet 81 mg, 81 mg, Oral, Daily, Kassi Espinoza MD  •  atorvastatin (LIPITOR) tablet 80 mg, 80 mg, Oral, QPM, Kassi Espinoza MD  •  cefTRIAXone (ROCEPHIN) IVPB (premix in dextrose) 1,000 mg 50 mL, 1,000 mg, Intravenous, Q24H, MYLA Baker, Last Rate: 100 mL/hr at 06/10/23 0650, 1,000 mg at 06/10/23 0650  •  Famotidine (PF) (PEPCID) injection 20 mg, 20 mg, Intravenous, Q12H [facility], MYLA Baker, 20 mg at 06/10/23 0143  •  heparin (porcine) 25,000 units in 0 45% NaCl 250 mL infusion (premix), 3-20 Units/kg/hr (Order-Specific), Intravenous, Titrated, Brendon Mccullough MD, Last Rate: 9 6 mL/hr at 06/10/23 0606, 12 Units/kg/hr at 06/10/23 0606  •  heparin (porcine) injection 2,000 Units, 2,000 Units, Intravenous, Q6H PRN, Brendon Mccullough MD  •  heparin (porcine) injection 4,000 Units, 4,000 Units, Intravenous, Q6H PRN, Brendon Mccullough MD  •  ketorolac (TORADOL) injection 15 mg, 15 mg, Intravenous, Q6H PRN, MYLA Baker  •  metroNIDAZOLE (FLAGYL) IVPB (premix) 500 mg 100 mL, " "500 mg, Intravenous, Q8H, MYLA Baker, Last Rate: 200 mL/hr at 06/10/23 0844, 500 mg at 06/10/23 0844  •  nitroglycerin (NITROSTAT) SL tablet 0 4 mg, 0 4 mg, Sublingual, Q5 Min PRN, Mia Essex, MD  •  ondansetron [facility]) injection 4 mg, 4 mg, Intravenous, Q6H PRN, Mia Essex, MD  •  polyethylene glycol (MIRALAX) packet 17 g, 17 g, Oral, Daily, Mia Essex, MD, 17 g at 06/10/23 1002  •  [START ON 6/11/2023] potassium chloride (K-DUR,KLOR-CON) CR tablet 20 mEq, 20 mEq, Oral, Daily, Mia Essex, MD  •  [COMPLETED] potassium chloride 20 mEq IVPB (premix), 20 mEq, Intravenous, Once, Last Rate: 50 mL/hr at 06/10/23 0833, 20 mEq at 06/10/23 0833 **FOLLOWED BY** potassium chloride 20 mEq IVPB (premix), 20 mEq, Intravenous, Once, MYLA Baker, Last Rate: 50 mL/hr at 06/10/23 1038, 20 mEq at 06/10/23 1038  •  sodium chloride 0 9 % infusion, 75 mL/hr, Intravenous, Continuous, Mia Essex, MD, Held at 06/10/23 1027        Objective:   Vitals: Blood pressure 149/93, pulse 74, temperature 97 6 °F (36 4 °C), resp  rate 20, height 5' 11\" (1 803 m), weight 84 4 kg (186 lb), SpO2 100 %  Body mass index is 25 94 kg/m²    BP Readings from Last 3 Encounters:   06/10/23 149/93   11/26/21 126/70   10/30/16 138/70     Orthostatic Blood Pressures    Flowsheet Row Most Recent Value   Blood Pressure 149/93 filed at 06/10/2023 2885   Patient Position - Orthostatic VS Lying filed at 06/10/2023 0715          Intake/Output Summary (Last 24 hours) at 6/10/2023 1156  Last data filed at 6/10/2023 1567  Gross per 24 hour   Intake 1000 ml   Output 400 ml   Net 600 ml       Invasive Devices     Peripheral Intravenous Line  Duration           Peripheral IV 06/10/23 Left;Lateral Antecubital <1 day    Peripheral IV 06/10/23 Right;Upper Arm <1 day                  Physical Exam:   Physical Exam    Neurologic:  Alert & oriented x 3, no new focal deficits, Not in any acute distress,  Constitutional:  Well developed, well " "nourished, non-toxic appearance   Eyes:  Pupil equal and reacting to light, conjunctiva normal   HENT:  Atraumatic, oropharynx moist, Neck- normal range of motion, no tenderness, supple   Respiratory:  Bilateral air entry, mostly clear to auscultation  Cardiovascular: S1-S2 regular rhythm, tachycardic with a 1/6 systolic murmur  GI:  Soft, nondistended, normal bowel sounds, nontender, no hepatosplenomegaly appreciated  Musculoskeletal:  no tenderness, no deformities     Skin:  Well hydrated, no rash   Lymphatic:  No lymphadenopathy noted   Extremities: No edema     Labs:   Troponins:   Results from last 7 days   Lab Units 06/10/23  0228   CK TOTAL U/L 989*       CBC with diff:   Results from last 7 days   Lab Units 06/10/23  0228   HEMATOCRIT % 43 5   HEMOGLOBIN g/dL 15 4   MCH pg 31 3   MCHC g/dL 35 4   MCV fL 88   MPV fL 11 6   NRBC AUTO /100 WBCs 0   PLATELETS Thousands/uL 227   RBC Million/uL 4 92   RDW % 11 9   WBC Thousand/uL 18 84*       CMP:   Results from last 7 days   Lab Units 06/10/23  0228   ANION GAP mmol/L 18*   ALBUMIN g/dL 5 1*   ALK PHOS U/L 42   ALT U/L 31   AST U/L 45*   BUN mg/dL 25   CALCIUM mg/dL 9 8   CHLORIDE mmol/L 95*   CO2 mmol/L 19*   CREATININE mg/dL 1 33*   EGFR ml/min/1 73sq m 69   GLUCOSE RANDOM mg/dL 156*   POTASSIUM mmol/L 2 9*   SODIUM mmol/L 132*   TOTAL BILIRUBIN mg/dL 0 72   TOTAL PROTEIN g/dL 8 1       Magnesium:   Results from last 7 days   Lab Units 06/10/23  0228   MAGNESIUM mg/dL 1 7*     Coags:   Results from last 7 days   Lab Units 06/10/23  0228   INR  1 13   PTT seconds 28     TSH:      No components found for: \"TSH3\"  Lipid Profile:   Results from last 7 days   Lab Units 06/10/23  0228   CHOLESTEROL mg/dL 159   HDL mg/dL 62   LDL CALC mg/dL 84   TRIGLYCERIDES mg/dL 66     Lipid Profile:   Lab Results   Component Value Date    CHOLESTEROL 159 06/10/2023    HDL 62 06/10/2023    LDLCALC 84 06/10/2023    TRIG 66 06/10/2023     Hgb A1c:     NT-proBNP: No results for " "input(s): \"NTBNP\" in the last 72 hours  Imaging & Testing   Cardiac testing:   TTE, EF 50-55%      Imaging:   US right upper quadrant    Result Date: 6/10/2023  Narrative: RIGHT UPPER QUADRANT ULTRASOUND INDICATION:     r/o jovana  COMPARISON:  None TECHNIQUE:   Real-time ultrasound of the right upper quadrant was performed with a curvilinear transducer with both volumetric sweeps and still imaging techniques  FINDINGS: PANCREAS:  Visualized portions of the pancreas are within normal limits  AORTA AND IVC:  Visualized portions are normal for patient age  LIVER: Size:  Within normal range  The liver measures 15 5 cm in the midclavicular line  Contour:  Surface contour is smooth  Parenchyma:  Echogenicity and echotexture are within normal limits  No liver mass identified  Limited imaging of the main portal vein shows it to be patent and hepatopetal  BILIARY: Diffuse gallbladder wall thickening measuring 11 mm  Lavaun Kim sign is negative, however patient is medicated  No intrahepatic biliary dilatation  CBD measures 5 0 mm  No choledocholithiasis  KIDNEY: Right kidney measures 11 8 x 5 4 x 5 0 cm  Volume 168 3 mL Kidney within normal limits  ASCITES:   None  Impression: Diffuse gallbladder wall thickening similar to CT  Ahmadi's sign is negative as patient is medicated  Acute cholecystitis is still in the differential and further management based on clinical criteria  Workstation performed: [id number]     XR chest 1 view portable    Result Date: 6/10/2023  Narrative: CHEST INDICATION:   chest pain sob  COMPARISON: 3/5/2016 CT EXAM PERFORMED/VIEWS:  XR CHEST PORTABLE Single view FINDINGS: Cardiomediastinal silhouette appears unremarkable  The lungs are clear  No pneumothorax or pleural effusion  Osseous structures appear within normal limits for patient age  Impression: No acute cardiopulmonary disease   Findings are stable Workstation performed: [id number]     CT abdomen pelvis with contrast    Result Date: " 6/10/2023  Narrative: CT ABDOMEN AND PELVIS WITH IV CONTRAST INDICATION:   r/o appendicitis, low abd pain, also on meth and can't provide hx  COMPARISON: 3/5/2016  TECHNIQUE:  CT examination of the abdomen and pelvis was performed  Multiplanar 2D reformatted images were created from the source data  This examination, like all CT scans performed in the [facility], was performed utilizing techniques to minimize radiation dose exposure, including the use of iterative reconstruction and automated exposure control  Radiation dose length product (DLP) for this visit:  484 mGy-cm IV Contrast:  100 mL of iohexol (OMNIPAQUE) Enteric Contrast:  Enteric contrast was not administered  FINDINGS: Exam limited by motion artifact  ABDOMEN LOWER CHEST:  No clinically significant abnormality identified in the visualized lower chest  LIVER/BILIARY TREE:  Unremarkable  GALLBLADDER: No calcified gallstones  Diffuse gallbladder wall thickening and pericholecystic edema noted  Recommend right upper quadrant ultrasound for better evaluation  No significant intrahepatic or extrahepatic biliary ductal dilatation  SPLEEN:  Unremarkable  PANCREAS:  Unremarkable  ADRENAL GLANDS:  Unremarkable  KIDNEYS/URETERS:  Unremarkable  No hydronephrosis  STOMACH AND BOWEL: Stomach is mildly distended with air and fluid  No intraluminal mass seen  Subtle diffuse wall thickening of the stomach could be due to incomplete distention or nonspecific gastritis  Small and large bowel loops appear normal in course and caliber without evidence for obstruction  Terminal ileum and appendix are unremarkable  Left colon is predominantly contracted limiting evaluation  Wall thickening of the descending and sigmoid colon could be due to under distention or focal colitis  No pericolonic free air or abscess/fluid collection  APPENDIX:  No findings to suggest appendicitis   ABDOMINOPELVIC CAVITY: Small amount of nonspecific lower pelvic free fluid noted which is pathologic in a male patient  No pneumoperitoneum  No lymphadenopathy  VESSELS:  Unremarkable for patient's age  PELVIS REPRODUCTIVE ORGANS:  Unremarkable for patient's age  URINARY BLADDER: Moderately distended and grossly unremarkable    ABDOMINAL WALL/INGUINAL REGIONS:  Unremarkable  OSSEOUS STRUCTURES:  No acute fracture or destructive osseous lesion  Impression: 1  Diffuse gallbladder wall thickening and pericholecystic edema noted without calcified gallstones  Recommend right upper quadrant ultrasound for better evaluation  No significant intrahepatic or extrahepatic biliary ductal dilatation  2   Subtle diffuse wall thickening of the stomach, descending colon, sigmoid colon, and rectum could be related to under distention versus gastritis/colitis  Recommend clinical correlation  3   No evidence for bowel obstruction, appendicitis, obstructive uropathy, or free air  4   Small amount of nonspecific pathological free fluid in the pelvis of uncertain etiology   Workstation performed: [id number]     EKG: Sinus rhythm with marked sinus arrhythmia  Monitor: Sinus tachycardia at 127/min       Medications/ Allergies:     Current Facility-Administered Medications   Medication Dose Route Frequency Provider Last Rate   • acetaminophen  650 mg Oral Q6H PRN Aidan Gordon MD     • [START ON 6/11/2023] aspirin  81 mg Oral Daily Aidan Gordon MD     • atorvastatin  80 mg Oral QPM Aidan Gordon MD     • cefTRIAXone  1,000 mg Intravenous Q24H MYLA Baker 1,000 mg (06/10/23 0650)   • famotidine  20 mg Intravenous Q12H [address] MYLA Baker     • heparin (porcine)  3-20 Units/kg/hr (Order-Specific) Intravenous Titrated Delano Paige MD 12 Units/kg/hr (06/10/23 0606)   • heparin (porcine)  2,000 Units Intravenous Q6H PRN Delano Paige MD     • heparin (porcine)  4,000 Units Intravenous Q6H PRN Delano Paige MD     • ketorolac  15 mg Intravenous Q6H PRN MYLA Worthington     • "metroNIDAZOLE  500 mg Intravenous Q8H MYLA Baker 500 mg (06/10/23 0844)   • nitroglycerin  0 4 mg Sublingual Q5 Min PRN Wanda Christie MD     • ondansetron  4 mg Intravenous Q6H PRN Wanda Christie MD     • polyethylene glycol  17 g Oral Daily Wanda Christie MD     • [START ON 6/11/2023] potassium chloride  20 mEq Oral Daily Wanda Christie MD     • potassium chloride  20 mEq Intravenous Once MYLA Baker 20 mEq (06/10/23 1038)   • sodium chloride  75 mL/hr Intravenous Continuous Wanda Christie MD Stopped (06/10/23 1027)     acetaminophen, 650 mg, Q6H PRN  heparin (porcine), 2,000 Units, Q6H PRN  heparin (porcine), 4,000 Units, Q6H PRN  ketorolac, 15 mg, Q6H PRN  nitroglycerin, 0 4 mg, Q5 Min PRN  ondansetron, 4 mg, Q6H PRN      No Known Allergies    Code Status: Level 1 - Full Code  Advance Directive and Living Will:      POLST:        Gabriela Salinas MD, [facility]      \"This note has been constructed using a voice recognition system  Therefore there may be syntax, spelling, and/or grammatical errors  Please call if you have any questions   \"  "

## 2023-06-10 NOTE — ED NOTES
Attempted to ready patient for transfer, patient in bathroom at this time          Wilbur Cuenca RN  06/10/23 5441

## 2023-06-10 NOTE — H&P
[facility]  64   H&P  Name: Julian Patel 27 y o  male I MRN: [id number]  Unit/Bed#: [address] [place] [address] I Date of Admission: 6/10/2023   Date of Service: 6/10/2023 I Hospital Day: 0      Assessment/Plan   Chest pain  Assessment & Plan  ACS R/o    Low risk:   /   DOLORES Risk Score    Flowsheet Row Most Recent Value   Age >= 72 0 Filed at: 06/10/2023 2139   Known CAD (stenosis >= 50%) 0 Filed at: 06/10/2023 5900   Recent (<=24 hrs) Service Angina 1 Filed at: 06/10/2023 0921   ST Deviation >= 0 5 mm 0 Filed at: 06/10/2023 2504   3+ CAD Risk Factors (FHx, HTN, HLP, DM, Smoker) 0 Filed at: 06/10/2023 7183   Aspirin Use Past 7 Days 0 Filed at: 06/10/2023 5468   Elevated Cardiac Markers 1 Filed at: 06/10/2023 7244   DOLORES Risk Score (Calculated) 2 Filed at: 06/10/2023 6856      •   • EKG: Sinus bradycardia  • CXR-No acute cardiopulmonary disease  Serial Troponin I  trending   Likely type 2 MI secondary Meth use vs ACS  Echo: Pending  Telemetry  • CBC, CMP- trending   • BNP-pending  • FwS5l-bascmdc%,   • Lipid Panel-pending  • Telemetry in place  • Meds: As needed morphine, nitrates, supplemental oxygen  • Cardiology recs:  o Continue aspirin, heparin drip, atorvastatin  o Replace potassium and magnesium  o Follow-up electrolytes, creatinine, potassium and magnesium  o Repeat troponin in a m   o Calcium channel blocker  o Consider CTA of chest and abdomen to rule out dissection                 Generalized abdominal pain  Assessment & Plan  · CT abdomen:   · Diffuse gallbladder wall thickening and pericholecystic edema noted without calcified gallstones  Recommend right upper quadrant ultrasound for better evaluation  No significant intrahepatic or extrahepatic biliary ductal dilatation  · 2  Subtle diffuse wall thickening of the stomach, descending colon, sigmoid colon, and rectum could be related to under distention versus gastritis/colitis     · RUQ ultrasound-pending  · Consider GI/surgical consult if positive findings  · Empiric Rocephin  · CTA dissection protocol:  · Trace amount of complex abnormal fluid in the pelvis for a male patient could relate to infectious/inflammatory process or other etiology, correlate clinically  No evidence of intramural hematoma, dissection or aneurysm  · Persistent diffuse gallbladder wall thickening as seen on ultrasound  Sepsis ([facility] )  Assessment & Plan  Evidenced by tachypnea, leukocytosis and possible colitis    · CT abdomen:  · Diffuse gallbladder wall thickening and pericholecystic edema noted without calcified gallstones  Recommend RUQ US for better evaluation  No significant intrahepatic or extrahepatic biliary ductal dilatation  2   Subtle diffuse wall thickening of the stomach, descending  colon, sigmoid colon, and rectum could be related to under  distention versus gastritis/colitis  Recommend correlation  3   No evidence for bowel obstruction, appendicitis, obstructive  uropathy, or free air  4   Small amount of nonspecific pathological free fluid in the  pelvis of uncertain etiology    · Blood cultures-pending  · Pro-Nam/lactic acid-pending  · Will initiate empiric antibiotic Rocephin  · IVF  · Monitor SSX of infection          Methamphetamine use ([facility] )  Assessment & Plan  PTA with symptoms of chest pain    · UDS-congruent with methamphetamine and THC  · Counseled on cessation  · Contraindicated beta-blockers for symptomatic management    Elevated glucose  Assessment & Plan  · POC-156  · [id number]  · Will initiate SSI if needed    Metabolic acidosis, increased anion gap (IAG)  Assessment & Plan  · AG-18  · LA- elevated likely due to underlying cell death from cardiac demand, repeat pending  · Beta hydroxybutyrate-pending  · Ethanol-pending  · Continue IVF    Elevated serum creatinine  Assessment & Plan  · CR-1 33  · Avoid nephrotoxic's  · We will monitor    Electrolyte abnormality  Assessment & Plan  · K+-2 9, replenished in ED will continue  · NA-132/CL-95, continue IVF  · Mag-1 7, replenish and monitor  · Trend    Elevated CK  Assessment & Plan  · CK-989  · Likely secondary to primary admission diagnosis  · We will monitor  · Continue IVF    Thickening of wall of gallbladder with pericholecystic fluid  Assessment & Plan  · CT abdomen:Diffuse gallbladder wall thickening and pericholecystic edema noted without calcified gallstones  Recommend right upper quadrant ultrasound for better evaluation  No significant intrahepatic or extrahepatic biliary ductal dilatation  · RUQ US-pending  · Hepatitis panel-pending       VTE Pharmacologic Prophylaxis:   Moderate Risk (Score 3-4) - Pharmacological DVT Prophylaxis Ordered: heparin drip  Code Status: Level 1 - Full Code   Discussion with family: Patient declined call to   Anticipated Length of Stay: Patient will be admitted on an inpatient basis with an anticipated length of stay of greater than 2 midnights secondary to Clinical course and specialist recommendations  Total Time Spent on Date of Encounter in care of patient: 55 minutes This time was spent on one or more of the following: performing physical exam; counseling and coordination of care; obtaining or reviewing history; documenting in the medical record; reviewing/ordering tests, medications or procedures; communicating with other healthcare professionals and discussing with patient's family/caregivers  Chief Complaint: Chest pain    History of Present Illness:  Ila Tapia is a 27 y o  male with a PMH of substance abuse who presents with chest pain  Patient reported that he recently recent labs yesterday and did meth last night, patient reported last dose of meth was in 2020  Patient reported going to drug rehab in the past   Patient reported chest pain 30 minutes after consuming meth    Patient reported currently having chest pressure localized over left chest wall, numbness in his fingers and left-sided abdominal pain  Patient reported that he cannot pee and is having problems with bowel movements yesterday patient reported associated pain with urination  Patient denied any discharge  Patient's girlfriend at bedside  Nursing reported having difficulty obtaining labs from patient  Review of Systems:  Review of Systems   Constitutional: Negative for chills and fever  HENT: Negative for ear pain and sore throat  Eyes: Negative for pain and visual disturbance  Respiratory: Negative for cough and shortness of breath  Cardiovascular: Positive for chest pain  Negative for palpitations and leg swelling  Gastrointestinal: Positive for abdominal pain  Negative for nausea and vomiting  Genitourinary: Positive for difficulty urinating and dysuria  Negative for hematuria  Musculoskeletal: Negative for arthralgias and back pain  Skin: Negative for color change and rash  Neurological: Negative for seizures and syncope  All other systems reviewed and are negative  Past Medical and Surgical History:   History reviewed  No pertinent past medical history  History reviewed  No pertinent surgical history  Meds/Allergies:  Prior to Admission medications    Medication Sig Start Date End Date Taking? Authorizing Provider   bacitracin topical ointment 500 units/g topical ointment Apply 1 large application topically 2 (two) times a day 11/26/21   Rani Christianson DO     I have reviewed home medications using recent Epic encounter      Allergies: No Known Allergies    Social History:  Marital Status: Single   Occupation: Unknown  Patient Pre-hospital Living Situation: Home  Patient Pre-hospital Level of Mobility: walks  Patient Pre-hospital Diet Restrictions:   Substance Use History:   Social History     Substance and Sexual Activity   Alcohol Use Not Currently    Comment: drinks occasional throughout week      Social History     Tobacco Use   Smoking Status Former   • Packs/day: 1 00   • Years: 10 00 "  • Total pack years: 10 00   • Types: Cigarettes   Smokeless Tobacco Not on file   Tobacco Comments    refused teaching      Social History     Substance and Sexual Activity   Drug Use Yes   • Types: Marijuana, Methamphetamines    Comment: \"once in awile\"       Family History:  Family History   Problem Relation Age of Onset   • No Known Problems Mother    • No Known Problems Father    • No Known Problems Sister    • No Known Problems Brother        Physical Exam:     Vitals:   Blood Pressure: 130/88 (06/10/23 1527)  Pulse: 68 (06/10/23 1527)  Temperature: 97 9 °F (36 6 °C) (06/10/23 1527)  Temp Source: Tympanic (06/10/23 0242)  Respirations: 18 (06/10/23 1527)  Height: 5' 11\" (180 3 cm) (06/10/23 0842)  Weight - Scale: 84 4 kg (186 lb) (06/10/23 0842)  SpO2: 100 % (06/10/23 1527)    Physical Exam  Vitals and nursing note reviewed  Constitutional:       General: He is not in acute distress  Appearance: He is well-developed  HENT:      Head: Normocephalic and atraumatic  Eyes:      Conjunctiva/sclera: Conjunctivae normal    Cardiovascular:      Rate and Rhythm: Normal rate and regular rhythm  Heart sounds: No murmur heard  Pulmonary:      Effort: Pulmonary effort is normal  No respiratory distress  Breath sounds: Normal breath sounds  No wheezing or rhonchi  Abdominal:      General: There is no distension  Palpations: Abdomen is soft  Tenderness: There is abdominal tenderness  There is rebound  There is no guarding  Musculoskeletal:         General: No swelling  Cervical back: Neck supple  Skin:     General: Skin is warm and dry  Capillary Refill: Capillary refill takes less than 2 seconds  Neurological:      Mental Status: He is alert  Psychiatric:         Mood and Affect: Mood normal          Behavior: Behavior normal          Thought Content:  Thought content normal          Additional Data:     Lab Results:  Results from last 7 days   Lab Units 06/10/23  0228 " EOS PCT % 0   HEMATOCRIT % 43 5   HEMOGLOBIN g/dL 15 4   LYMPHS PCT % 7*   MONOS PCT % 5   NEUTROS PCT % 88*   PLATELETS Thousands/uL 227   WBC Thousand/uL 18 84*     Results from last 7 days   Lab Units 06/10/23  0228   ANION GAP mmol/L 18*   ALBUMIN g/dL 5 1*   ALK PHOS U/L 42   ALT U/L 31   AST U/L 45*   BUN mg/dL 25   CALCIUM mg/dL 9 8   CHLORIDE mmol/L 95*   CO2 mmol/L 19*   CREATININE mg/dL 1 33*   GLUCOSE RANDOM mg/dL 156*   POTASSIUM mmol/L 2 9*   SODIUM mmol/L 132*   TOTAL BILIRUBIN mg/dL 0 72     Results from last 7 days   Lab Units 06/10/23  0228   INR  1 13             Results from last 7 days   Lab Units 06/10/23  1409 06/10/23  0228   LACTIC ACID mmol/L 3 8*  --    PROCALCITONIN ng/ml  --  <0 05       Lines/Drains:  Invasive Devices     Peripheral Intravenous Line  Duration           Peripheral IV 06/10/23 Left;Lateral Antecubital <1 day    Peripheral IV 06/10/23 Right;Upper Arm <1 day                    Imaging: Reviewed radiology reports from this admission including: chest xray  CTA dissection protocol chest/abdomen/pelvis   Final Result by Thai Ortiz MD (06/10 6784)      Trace amount of complex abnormal fluid in the pelvis for a male patient could relate to infectious/inflammatory process or other etiology, correlate clinically  No evidence of intramural hematoma, dissection or aneurysm  Persistent diffuse gallbladder wall thickening as seen on ultrasound  Workstation performed: [id number]         US right upper quadrant   Final Result by Yolis Oliveros MD (06/10 4391)      Diffuse gallbladder wall thickening similar to CT  Ahmadi's sign is negative as patient is medicated  Acute cholecystitis is still in the differential and further management based on clinical criteria  Workstation performed: [id number]         CT abdomen pelvis with contrast   Final Result by Maricarmen Perez MD (06/10 1103)      1    Diffuse gallbladder wall thickening and pericholecystic edema noted without calcified gallstones  Recommend right upper quadrant ultrasound for better evaluation  No significant intrahepatic or extrahepatic biliary ductal dilatation  2   Subtle diffuse wall thickening of the stomach, descending colon, sigmoid colon, and rectum could be related to under distention versus gastritis/colitis  Recommend clinical correlation  3   No evidence for bowel obstruction, appendicitis, obstructive uropathy, or free air  4   Small amount of nonspecific pathological free fluid in the pelvis of uncertain etiology  Workstation performed: [id number]         XR chest 1 view portable   Final Result by Sheri Garcia MD (06/10 1075)      No acute cardiopulmonary disease  Findings are stable            Workstation performed: [id number]             EKG and Other Studies Reviewed on Admission:   · EKG: Sinus Bradycardia  HR 56     ** Please Note: This note has been constructed using a voice recognition system   **

## 2023-06-10 NOTE — ASSESSMENT & PLAN NOTE
· CT abdomen:Diffuse gallbladder wall thickening and pericholecystic edema noted without calcified gallstones  Recommend right upper quadrant ultrasound for better evaluation  No significant intrahepatic or extrahepatic biliary ductal dilatation    · RUQ US-pending  · Hepatitis panel-pending

## 2023-06-10 NOTE — ED NOTES
Repeat EKG completed with 2 hr troponin        Goyo [place] [facility]  06/10/23 1079

## 2023-06-10 NOTE — ED NOTES
X-ray at bedside  Patient on continuous cardiac monitor, continuous pulse oximetry, and vital signs Q30        [facility]  06/10/23 9964

## 2023-06-11 ENCOUNTER — APPOINTMENT (INPATIENT)
Dept: RADIOLOGY | Facility: HOSPITAL | Age: 33
DRG: 720 | End: 2023-06-11
Payer: COMMERCIAL

## 2023-06-11 PROBLEM — R10.84 GENERALIZED ABDOMINAL PAIN: Status: RESOLVED | Noted: 2023-06-10 | Resolved: 2023-06-11

## 2023-06-11 PROBLEM — E87.29 METABOLIC ACIDOSIS, INCREASED ANION GAP (IAG): Status: RESOLVED | Noted: 2023-06-10 | Resolved: 2023-06-11

## 2023-06-11 PROBLEM — R79.89 ELEVATED SERUM CREATININE: Status: RESOLVED | Noted: 2023-06-10 | Resolved: 2023-06-11

## 2023-06-11 LAB
ALBUMIN SERPL BCP-MCNC: 4.3 G/DL (ref 3.5–5)
ALP SERPL-CCNC: 44 U/L (ref 34–104)
ALT SERPL W P-5'-P-CCNC: 26 U/L (ref 7–52)
ANION GAP SERPL CALCULATED.3IONS-SCNC: 10 MMOL/L (ref 4–13)
APTT PPP: 88 SECONDS (ref 23–37)
APTT PPP: 88 SECONDS (ref 23–37)
AST SERPL W P-5'-P-CCNC: 41 U/L (ref 13–39)
BILIRUB SERPL-MCNC: 1.44 MG/DL (ref 0.2–1)
BUN SERPL-MCNC: 13 MG/DL (ref 5–25)
CALCIUM SERPL-MCNC: 8.9 MG/DL (ref 8.4–10.2)
CARDIAC TROPONIN I PNL SERPL HS: 985 NG/L (ref 8–18)
CHLORIDE SERPL-SCNC: 104 MMOL/L (ref 96–108)
CK SERPL-CCNC: 535 U/L (ref 39–308)
CO2 SERPL-SCNC: 20 MMOL/L (ref 21–32)
CREAT SERPL-MCNC: 1.17 MG/DL (ref 0.6–1.3)
ERYTHROCYTE [DISTWIDTH] IN BLOOD BY AUTOMATED COUNT: 12.9 % (ref 11.6–15.1)
EST. AVERAGE GLUCOSE BLD GHB EST-MCNC: 100 MG/DL
GFR SERPL CREATININE-BSD FRML MDRD: 80 ML/MIN/1.73SQ M
GLUCOSE SERPL-MCNC: 98 MG/DL (ref 65–140)
HBA1C MFR BLD: 5.1 %
HCT VFR BLD AUTO: 42.2 % (ref 36.5–49.3)
HGB BLD-MCNC: 14.8 G/DL (ref 12–17)
MAGNESIUM SERPL-MCNC: 2 MG/DL (ref 1.9–2.7)
MCH RBC QN AUTO: 31.6 PG (ref 26.8–34.3)
MCHC RBC AUTO-ENTMCNC: 35.1 G/DL (ref 31.4–37.4)
MCV RBC AUTO: 90 FL (ref 82–98)
PLATELET # BLD AUTO: 191 THOUSANDS/UL (ref 149–390)
PMV BLD AUTO: 11.5 FL (ref 8.9–12.7)
POTASSIUM SERPL-SCNC: 3.7 MMOL/L (ref 3.5–5.3)
PROT SERPL-MCNC: 7 G/DL (ref 6.4–8.4)
RBC # BLD AUTO: 4.68 MILLION/UL (ref 3.88–5.62)
SODIUM SERPL-SCNC: 134 MMOL/L (ref 135–147)
WBC # BLD AUTO: 14.05 THOUSAND/UL (ref 4.31–10.16)

## 2023-06-11 PROCEDURE — 85027 COMPLETE CBC AUTOMATED: CPT | Performed by: STUDENT IN AN ORGANIZED HEALTH CARE EDUCATION/TRAINING PROGRAM

## 2023-06-11 PROCEDURE — 85730 THROMBOPLASTIN TIME PARTIAL: CPT | Performed by: STUDENT IN AN ORGANIZED HEALTH CARE EDUCATION/TRAINING PROGRAM

## 2023-06-11 PROCEDURE — 99232 SBSQ HOSP IP/OBS MODERATE 35: CPT | Performed by: INTERNAL MEDICINE

## 2023-06-11 PROCEDURE — 80053 COMPREHEN METABOLIC PANEL: CPT | Performed by: STUDENT IN AN ORGANIZED HEALTH CARE EDUCATION/TRAINING PROGRAM

## 2023-06-11 PROCEDURE — 84484 ASSAY OF TROPONIN QUANT: CPT | Performed by: INTERNAL MEDICINE

## 2023-06-11 PROCEDURE — 82550 ASSAY OF CK (CPK): CPT | Performed by: STUDENT IN AN ORGANIZED HEALTH CARE EDUCATION/TRAINING PROGRAM

## 2023-06-11 PROCEDURE — 83735 ASSAY OF MAGNESIUM: CPT | Performed by: STUDENT IN AN ORGANIZED HEALTH CARE EDUCATION/TRAINING PROGRAM

## 2023-06-11 PROCEDURE — 99231 SBSQ HOSP IP/OBS SF/LOW 25: CPT | Performed by: STUDENT IN AN ORGANIZED HEALTH CARE EDUCATION/TRAINING PROGRAM

## 2023-06-11 PROCEDURE — 74018 RADEX ABDOMEN 1 VIEW: CPT

## 2023-06-11 RX ORDER — LORAZEPAM 2 MG/ML
0.5 INJECTION INTRAMUSCULAR EVERY 4 HOURS PRN
Status: DISCONTINUED | OUTPATIENT
Start: 2023-06-11 | End: 2023-06-13

## 2023-06-11 RX ORDER — AMOXICILLIN 250 MG
1 CAPSULE ORAL
Status: DISCONTINUED | OUTPATIENT
Start: 2023-06-11 | End: 2023-06-14 | Stop reason: HOSPADM

## 2023-06-11 RX ORDER — SACCHAROMYCES BOULARDII 250 MG
250 CAPSULE ORAL 2 TIMES DAILY
Status: DISCONTINUED | OUTPATIENT
Start: 2023-06-11 | End: 2023-06-14 | Stop reason: HOSPADM

## 2023-06-11 RX ADMIN — POLYETHYLENE GLYCOL 3350 17 G: 17 POWDER, FOR SOLUTION ORAL at 09:58

## 2023-06-11 RX ADMIN — HYDROXYZINE HYDROCHLORIDE 25 MG: 25 TABLET ORAL at 16:02

## 2023-06-11 RX ADMIN — PANTOPRAZOLE SODIUM 40 MG: 40 TABLET, DELAYED RELEASE ORAL at 05:05

## 2023-06-11 RX ADMIN — ASPIRIN 81 MG CHEWABLE TABLET 81 MG: 81 TABLET CHEWABLE at 09:58

## 2023-06-11 RX ADMIN — MAGNESIUM OXIDE TAB 400 MG (241.3 MG ELEMENTAL MG) 400 MG: 400 (241.3 MG) TAB at 17:18

## 2023-06-11 RX ADMIN — HEPARIN SODIUM 18 UNITS/KG/HR: 10000 INJECTION, SOLUTION INTRAVENOUS at 23:00

## 2023-06-11 RX ADMIN — SODIUM CHLORIDE 125 ML/HR: 0.9 INJECTION, SOLUTION INTRAVENOUS at 05:05

## 2023-06-11 RX ADMIN — LORAZEPAM 0.5 MG: 2 INJECTION INTRAMUSCULAR; INTRAVENOUS at 17:18

## 2023-06-11 RX ADMIN — LORAZEPAM 0.5 MG: 2 INJECTION INTRAMUSCULAR; INTRAVENOUS at 22:59

## 2023-06-11 RX ADMIN — DILTIAZEM HYDROCHLORIDE 30 MG: 30 TABLET, FILM COATED ORAL at 21:14

## 2023-06-11 RX ADMIN — HEPARIN SODIUM 18 UNITS/KG/HR: 10000 INJECTION, SOLUTION INTRAVENOUS at 05:34

## 2023-06-11 RX ADMIN — SENNOSIDES AND DOCUSATE SODIUM 1 TABLET: 50; 8.6 TABLET ORAL at 21:15

## 2023-06-11 RX ADMIN — DILTIAZEM HYDROCHLORIDE 30 MG: 30 TABLET, FILM COATED ORAL at 14:45

## 2023-06-11 RX ADMIN — SODIUM CHLORIDE 125 ML/HR: 0.9 INJECTION, SOLUTION INTRAVENOUS at 22:59

## 2023-06-11 RX ADMIN — MAGNESIUM OXIDE TAB 400 MG (241.3 MG ELEMENTAL MG) 400 MG: 400 (241.3 MG) TAB at 09:58

## 2023-06-11 RX ADMIN — METRONIDAZOLE 500 MG: 500 INJECTION, SOLUTION INTRAVENOUS at 10:11

## 2023-06-11 RX ADMIN — Medication 250 MG: at 17:18

## 2023-06-11 RX ADMIN — SODIUM CHLORIDE 125 ML/HR: 0.9 INJECTION, SOLUTION INTRAVENOUS at 14:42

## 2023-06-11 RX ADMIN — METRONIDAZOLE 500 MG: 500 INJECTION, SOLUTION INTRAVENOUS at 00:00

## 2023-06-11 RX ADMIN — METRONIDAZOLE 500 MG: 500 INJECTION, SOLUTION INTRAVENOUS at 16:01

## 2023-06-11 RX ADMIN — POTASSIUM CHLORIDE 20 MEQ: 1500 TABLET, EXTENDED RELEASE ORAL at 09:59

## 2023-06-11 RX ADMIN — KETOROLAC TROMETHAMINE 15 MG: 30 INJECTION, SOLUTION INTRAMUSCULAR at 16:57

## 2023-06-11 RX ADMIN — ATORVASTATIN CALCIUM 80 MG: 80 TABLET, FILM COATED ORAL at 17:18

## 2023-06-11 RX ADMIN — DILTIAZEM HYDROCHLORIDE 30 MG: 30 TABLET, FILM COATED ORAL at 05:09

## 2023-06-11 RX ADMIN — CEFTRIAXONE 1000 MG: 1 INJECTION, SOLUTION INTRAVENOUS at 05:33

## 2023-06-11 NOTE — PROGRESS NOTES
[facility]  Progress Note  Name: Chris Trinidad  MRN: [id number]  Unit/Bed#: [address] [address] I Date of Admission: 6/10/2023   Date of Service: 6/11/2023 I Hospital Day: 1    Assessment/Plan   Chest pain  Assessment & Plan  Currently asymptomatic      Low risk:   /   DOLORES Risk Score    Flowsheet Row Most Recent Value   Age >= 72 0 Filed at: 06/10/2023 9139   Known CAD (stenosis >= 50%) 0 Filed at: 06/10/2023 5184   Recent (<=24 hrs) Service Angina 1 Filed at: 06/10/2023 0921   ST Deviation >= 0 5 mm 0 Filed at: 06/10/2023 3829   3+ CAD Risk Factors (FHx, HTN, HLP, DM, Smoker) 0 Filed at: 06/10/2023 7570   Aspirin Use Past 7 Days 0 Filed at: 06/10/2023 4283   Elevated Cardiac Markers 1 Filed at: 06/10/2023 9674   DOLORES Risk Score (Calculated) 2 Filed at: 06/10/2023 3330        • EKG: Sinus bradycardia  • CXR-No acute cardiopulmonary disease  Serial Troponin I  trending down  Likely type 2 MI secondary Meth use vs ACS  Echo: Pending  Telemetry in place  • CBC, CMP- trending   • BNP-pending  • [id number]-mpmnktl%,   • Lipid Panel-pending  • Telemetry in place  • Meds: As needed morphine, nitrates, supplemental oxygen  • Cardiology recs:  o Continue aspirin, IV heparin, atorvastatin, p o  Cardizem  o Maintain adequate potassium and magnesium balance  o DC IV heparin in a m  if troponin is still receding  o Pharmacologic nuclear stress test after checking above result  o Calcium channel blocker                   Sepsis ([facility] )  Assessment & Plan  Evidenced by tachypnea, leukocytosis and possible colitis    · CT abdomen:  · Diffuse gallbladder wall thickening and pericholecystic edema noted without calcified gallstones  Recommend RUQ US for better evaluation  No significant intrahepatic or extrahepatic biliary ductal dilatation  2   Subtle diffuse wall thickening of the stomach, descending  colon, sigmoid colon, and rectum could be related to under  distention versus gastritis/colitis   Recommend correlation  3   No evidence for bowel obstruction, appendicitis, obstructive  uropathy, or free air  4   Small amount of nonspecific pathological free fluid in the  pelvis of uncertain etiology  · Blood cultures-pending  · Pro-Nam/lactic acid- WNL  · Will initiate empiric antibiotic Rocephin, will DC  · IVF  · Monitor SSX of infection          Methamphetamine use ([facility] [facility] )  Assessment & Plan  PTA with symptoms of chest pain    · UDS-congruent with methamphetamine and THC  · Counseled on cessation  · Contraindicated beta-blockers for symptomatic management    Generalized abdominal pain-resolved as of 6/11/2023  Assessment & Plan  Resolved  · CT abdomen:   · Diffuse gallbladder wall thickening and pericholecystic edema noted without calcified gallstones  Recommend right upper quadrant ultrasound for better evaluation  No significant intrahepatic or extrahepatic biliary ductal dilatation  · 2  Subtle diffuse wall thickening of the stomach, descending colon, sigmoid colon, and rectum could be related to under distention versus gastritis/colitis  · RUQ ultrasound-pending  · Consider GI/surgical consult if positive findings  · Empiric Rocephin  · Toradol for pain  · CTA dissection protocol:  · Trace amount of complex abnormal fluid in the pelvis for a male patient could relate to infectious/inflammatory process or other etiology, correlate clinically  No evidence of intramural hematoma, dissection or aneurysm  · Persistent diffuse gallbladder wall thickening as seen on ultrasound      Elevated glucose  Assessment & Plan  · POC-156  · [id number]  · Will initiate SSI if needed    Electrolyte abnormality  Assessment & Plan  Improving  · K+-2 9, replenished in ED will continue  · NA-132/CL-95, continue IVF  · Mag-1 7, replenish and monitor  · Trend    Elevated CK  Assessment & Plan  · CK-989 >535  · Likely secondary to primary admission diagnosis  · We will monitor  · Continue IVF    Thickening of wall of gallbladder with pericholecystic fluid  Assessment & Plan  Asymptomatic  · CT abdomen:Diffuse gallbladder wall thickening and pericholecystic edema noted without calcified gallstones  Recommend right upper quadrant ultrasound for better evaluation  No significant intrahepatic or extrahepatic biliary ductal dilatation  · RUQ US-pending  · Hepatitis panel-pending    Metabolic acidosis, increased anion gap (IAG)-resolved as of 6/11/2023  Assessment & Plan  Resolved  · AG-18  · LA- elevated likely due to underlying cell death from cardiac demand, repeat pending  · Beta hydroxybutyrate- WNL  · Ethanol- WNL  · Continue IVF    Elevated serum creatinine-resolved as of 6/11/2023  Assessment & Plan  · CR-1 33 > 1 17  · Avoid nephrotoxic's  · We will monitor           VTE Pharmacologic Prophylaxis:   Pharmacologic: Heparin Drip  Mechanical VTE Prophylaxis in Place: No    Patient Centered Rounds:  I performed bedside rounds with nursing staff today  Discussions with Specialists or Other Care Team Provider: Cardiology    Education and Discussions with Family / Patient: Patient    Time Spent for Care:  45 minutes This time was spent on one or more of the following: performing physical exam; counseling and coordination of care; obtaining or reviewing history; documenting in the medical record; reviewing/ordering tests, medications or procedures; communicating with other healthcare professionals and discussing with patient's family/caregivers  Current Length of Stay: 1 day(s)    Current Patient Status: Inpatient   Certification Statement: Clinical course and specialist recommendations    Discharge Plan: Anticipate discharge in 24-48 hrs to discharge location to be determined pending rehab evaluations  Code Status: Level 1 - Full Code      Subjective:   Patient seen and examined at bedside and reported no chest pain or pressure, no palpitations, no shortness of breath, no abdominal pain, no bladder or bowel problems    Patient reported chest pressure improved with cardiac lead was repositioned  Patient counseled about substance use and potential adverse effects  Patient reported he would no longer using meth  Patient no other concerns at this time  Objective:     Vitals:   Temp (24hrs), Av 3 °F (36 8 °C), Min:97 9 °F (36 6 °C), Max:98 7 °F (37 1 °C)    Temp:  [97 9 °F (36 6 °C)-98 7 °F (37 1 °C)] 98 3 °F (36 8 °C)  HR:  [] 80  Resp:  [18-19] 18  BP: (114-160)/() 137/80  SpO2:  [97 %-100 %] 100 %  Body mass index is 25 94 kg/m²  Input and Output Summary (last 24 hours): Intake/Output Summary (Last 24 hours) at 2023 1148  Last data filed at 2023 0759  Gross per 24 hour   Intake --   Output 425 ml   Net -425 ml       Physical Exam:     Physical Exam  Vitals reviewed  Constitutional:       General: He is not in acute distress  Appearance: Normal appearance  HENT:      Head: Atraumatic  Nose: No congestion  Eyes:      General: No scleral icterus  Pupils: Pupils are equal, round, and reactive to light  Cardiovascular:      Rate and Rhythm: Normal rate  Heart sounds: No murmur heard  Pulmonary:      Effort: No respiratory distress  Breath sounds: No wheezing, rhonchi or rales  Abdominal:      Palpations: Abdomen is soft  Tenderness: There is no abdominal tenderness  There is no right CVA tenderness, left CVA tenderness, guarding or rebound  Musculoskeletal:         General: No swelling or deformity  Normal range of motion  Cervical back: No tenderness  Right lower leg: No edema  Left lower leg: No edema  Feet:      Right foot:      Skin integrity: No callus  Skin:     General: Skin is warm  Capillary Refill: Capillary refill takes less than 2 seconds  Findings: No lesion or rash  Neurological:      Mental Status: He is oriented to person, place, and time  Cranial Nerves: No cranial nerve deficit        Coordination: Coordination normal    Psychiatric:         Mood and Affect: Mood normal          Speech: Speech is rapid and pressured  Behavior: Behavior normal          Thought Content: Thought content normal          Additional Data:     Labs:    Results from last 7 days   Lab Units 06/11/23  0419 06/10/23  0228   EOS PCT %  --  0   HEMATOCRIT % 42 2 43 5   HEMOGLOBIN g/dL 14 8 15 4   LYMPHS PCT %  --  7*   MONOS PCT %  --  5   NEUTROS PCT %  --  88*   PLATELETS Thousands/uL 191 227   WBC Thousand/uL 14 05* 18 84*     Results from last 7 days   Lab Units 06/11/23 0419   ALK PHOS U/L 44   ALT U/L 26   AST U/L 41*   BUN mg/dL 13   CALCIUM mg/dL 8 9   CHLORIDE mmol/L 104   CO2 mmol/L 20*   CREATININE mg/dL 1 17   POTASSIUM mmol/L 3 7     Results from last 7 days   Lab Units 06/10/23  0228   INR  1 13       * I Have Reviewed All Lab Data Listed Above    * Additional Pertinent Lab Tests Reviewed: No correct      Imaging:  Imaging Reports Reviewed Today Include: CT, CTA    Recent Cultures (last 7 days):           Last 24 Hours Medication List:   Current Facility-Administered Medications   Medication Dose Route Frequency Provider Last Rate   • aspirin  81 mg Oral Daily Taran Braga MD     • atorvastatin  80 mg Oral QPM Taran Braga MD     • cefTRIAXone  1,000 mg Intravenous Q24H MYLA Baker 1,000 mg (06/11/23 0533)   • diltiazem  30 mg Oral Q8H [address] Pan Diggs MD     • heparin (porcine)  3-20 Units/kg/hr (Order-Specific) Intravenous Titrated Monico Barker MD 18 Units/kg/hr (06/11/23 8844)   • heparin (porcine)  2,000 Units Intravenous Q6H PRN Monico Barker MD     • heparin (porcine)  4,000 Units Intravenous Q6H PRN Monico Barker MD     • hydrOXYzine HCL  25 mg Oral Q6H PRN MYLA Baker     • ketorolac  15 mg Intravenous Q6H PRN MYLA Baker     • magnesium Oxide  400 mg Oral BID Taran Braga MD     • metroNIDAZOLE  500 mg Intravenous Q8H MYLA Baker 500 mg (06/11/23 1011)   • "nitroglycerin  0 4 mg Sublingual Q5 Min PRN Rodolfo Fontenot MD     • ondansetron  4 mg Intravenous Q6H PRN Rodolfo Fontenot MD     • pantoprazole  40 mg Oral Early Morning Rodolfo Fontenot MD     • polyethylene glycol  17 g Oral Daily Rodolfo Fontenot MD     • potassium chloride  20 mEq Oral Daily Rodolfo Fontenot MD     • sodium chloride  125 mL/hr Intravenous Continuous Rodolfo Fontenot  mL/hr (06/11/23 3231)          Today, Patient Was Seen By: Rodolfo Fontenot MD    ** Please Note: \"This note has been constructed using a voice recognition system  Therefore there may be syntax, spelling, and/or grammatical errors  Please call if you have any questions   \"**    "

## 2023-06-11 NOTE — PLAN OF CARE
Problem: Potential for Falls  Goal: Patient will remain free of falls  Description: INTERVENTIONS:  - Educate patient/family on patient safety including physical limitations  - Instruct patient to call for assistance with activity   - Consult OT/PT to assist with strengthening/mobility   - Keep Call bell within reach  - Keep bed low and locked with side rails adjusted as appropriate  - Keep care items and personal belongings within reach  - Initiate and maintain comfort rounds  - Make Fall Risk Sign visible to staff  Problem: CARDIOVASCULAR - ADULT  Goal: Maintains optimal cardiac output and hemodynamic stability  Description: INTERVENTIONS:  - Monitor I/O, vital signs and rhythm  - Monitor for S/S and trends of decreased cardiac output  - Administer and titrate ordered vasoactive medications to optimize hemodynamic stability  - Assess quality of pulses, skin color and temperature  - Assess for signs of decreased coronary artery perfusion  - Instruct patient to report change in severity of symptoms  Outcome: Progressing  Goal: Absence of cardiac dysrhythmias or at baseline rhythm  Description: INTERVENTIONS:  - Continuous cardiac monitoring, vital signs, obtain 12 lead EKG if ordered  - Administer antiarrhythmic and heart rate control medications as ordered  - Monitor electrolytes and administer replacement therapy as ordered  Outcome: Progressing     Problem: PAIN - ADULT  Goal: Verbalizes/displays adequate comfort level or baseline comfort level  Description: Interventions:  - Encourage patient to monitor pain and request assistance  - Assess pain using appropriate pain scale  - Administer analgesics based on type and severity of pain and evaluate response  - Implement non-pharmacological measures as appropriate and evaluate response  - Consider cultural and social influences on pain and pain management  - Notify physician/advanced practitioner if interventions unsuccessful or patient reports new pain  Outcome: Progressing     Problem: INFECTION - ADULT  Goal: Absence or prevention of progression during hospitalization  Description: INTERVENTIONS:  - Assess and monitor for signs and symptoms of infection  - Monitor lab/diagnostic results  - Monitor all insertion sites, i e  indwelling lines, tubes, and drains  - Monitor endotracheal if appropriate and nasal secretions for changes in amount and color  - [place] appropriate cooling/warming therapies per order  - Administer medications as ordered  - Instruct and encourage patient and family to use good hand hygiene technique  - Identify and instruct in appropriate isolation precautions for identified infection/condition  Outcome: Progressing  Goal: Absence of fever/infection during neutropenic period  Description: INTERVENTIONS:  - Monitor WBC    Outcome: Completed     Problem: SAFETY ADULT  Goal: Patient will remain free of falls  Description: INTERVENTIONS:  - Educate patient/family on patient safety including physical limitations  - Instruct patient to call for assistance with activity   - Consult OT/PT to assist with strengthening/mobility   - Keep Call bell within reach  - Keep bed low and locked with side rails adjusted as appropriate  - Keep care items and personal belongings within reach  - Initiate and maintain comfort rounds  - Make Fall Risk Sign visible to staff  - Initiate/Maintain bed alarm  - Apply yellow socks and bracelet for high fall risk patients  - Consider moving patient to room near nurses station  Outcome: Progressing  Goal: Maintain or return to baseline ADL function  Description: INTERVENTIONS:  -  Assess patient's ability to carry out ADLs; assess patient's baseline for ADL function and identify physical deficits which impact ability to perform ADLs (bathing, care of mouth/teeth, toileting, grooming, dressing, etc )  - Assess/evaluate cause of self-care deficits   - Assess range of motion  - Assess patient's mobility; develop plan if impaired  - Assess patient's need for assistive devices and provide as appropriate  - Encourage maximum independence but intervene and supervise when necessary  - Involve family in performance of ADLs  - Assess for home care needs following discharge   - Consider OT consult to assist with ADL evaluation and planning for discharge  - Provide patient education as appropriate  Outcome: Progressing  Goal: Maintains/Returns to pre admission functional level  Description: INTERVENTIONS:  - Perform BMAT or MOVE assessment daily    - Set and communicate daily mobility goal to care team and patient/family/caregiver     - Collaborate with rehabilitation services on mobility goals if consulte  Problem: DISCHARGE PLANNING  Goal: Discharge to home or other facility with appropriate resources  Description: INTERVENTIONS:  - Identify barriers to discharge w/patient and caregiver  - Arrange for needed discharge resources and transportation as appropriate  - Identify discharge learning needs (meds, wound care, etc )  - Arrange for interpretive services to assist at discharge as needed  - Refer to Case Management Department for coordinating discharge planning if the patient needs post-hospital services based on physician/advanced practitioner order or complex needs related to functional status, cognitive ability, or social support system  Outcome: Progressing     - Out of bed for toileting  - Record patient progress and toleration of activity level   Outcome: Progressing     - Initiate/Maintain bed alarm  - Apply yellow socks and bracelet for high fall risk patients  - Consider moving patient to room near nurses station  Outcome: Progressing

## 2023-06-11 NOTE — ASSESSMENT & PLAN NOTE
Evidenced by tachypnea, leukocytosis and possible colitis    · CT abdomen:  · Diffuse gallbladder wall thickening and pericholecystic edema noted without calcified gallstones  Recommend RUQ US for better evaluation  No significant intrahepatic or extrahepatic biliary ductal dilatation  2   Subtle diffuse wall thickening of the stomach, descending  colon, sigmoid colon, and rectum could be related to under  distention versus gastritis/colitis  Recommend correlation  3   No evidence for bowel obstruction, appendicitis, obstructive  uropathy, or free air  4   Small amount of nonspecific pathological free fluid in the  pelvis of uncertain etiology    · Blood cultures-pending  · Pro-Nam/lactic acid- WNL  · Will initiate empiric antibiotic Rocephin, will DC  · IVF  · Monitor SSX of infection

## 2023-06-11 NOTE — ASSESSMENT & PLAN NOTE
Resolved  · AG-18  · LA- elevated likely due to underlying cell death from cardiac demand, repeat pending  · Beta hydroxybutyrate- WNL  · Ethanol- WNL  · Continue IVF

## 2023-06-11 NOTE — ASSESSMENT & PLAN NOTE
Resolved  · CT abdomen:   · Diffuse gallbladder wall thickening and pericholecystic edema noted without calcified gallstones  Recommend right upper quadrant ultrasound for better evaluation  No significant intrahepatic or extrahepatic biliary ductal dilatation  · 2  Subtle diffuse wall thickening of the stomach, descending colon, sigmoid colon, and rectum could be related to under distention versus gastritis/colitis  · RUQ ultrasound-pending  · Consider GI/surgical consult if positive findings  · Empiric Rocephin  · Toradol for pain  · CTA dissection protocol:  · Trace amount of complex abnormal fluid in the pelvis for a male patient could relate to infectious/inflammatory process or other etiology, correlate clinically  No evidence of intramural hematoma, dissection or aneurysm  · Persistent diffuse gallbladder wall thickening as seen on ultrasound

## 2023-06-11 NOTE — UTILIZATION REVIEW
NOTIFICATION OF INPATIENT ADMISSION   AUTHORIZATION REQUEST   SERVICING FACILITY:   [facility] ([place], [place] [address]  [address] [place] [address]  Tax ID: [id number]  NPI: [id number] ATTENDING PROVIDER:  Attending Name and NPI#: Marcia Tate [[id number]]  Address: [address] [place] [address]  Phone: [phone]   ADMISSION INFORMATION:  Place of Service: Inpatient [address] [facility]  60[address]  Place of Service Code: 21  Inpatient Admission Date/Time: 6/10/23  6:20 AM  Discharge Date/Time: No discharge date for patient encounter  Admitting Diagnosis Code/Description:  Chest pain [R07 9]  Abdominal pain [R10 9]  Methamphetamine abuse ([facility] ) [F15 10]  [facility] (non-ST elevated [facility]) [facility] ) [I21 4]     UTILIZATION REVIEW CONTACT:  Elli Sheppard Utilization   Network Utilization Review Department  Phone: [phone]  Fax [phone]  Email: [email or web address]  Contact for approvals/pending authorizations, clinical reviews, and discharge  PHYSICIAN ADVISORY SERVICES:  Medical Necessity Denial & Fzbc-nk-[id number] Review  Phone: [phone]  Fax: [phone]  Email: [email or web address]

## 2023-06-11 NOTE — UTILIZATION REVIEW
Attention Clinical Reviewer: This patient is currently a prisoner  Initial Clinical Review    Admission: Date/Time/Statement:   Admission Orders (From admission, onward)     Ordered        06/10/23 0620  INPATIENT ADMISSION  Once                      Orders Placed This Encounter   Procedures   • INPATIENT ADMISSION     Standing Status:   Standing     Number of Occurrences:   1     Order Specific Question:   Level of Care     Answer:   Med Surg [16]     Order Specific Question:   Estimated length of stay     Answer:   More than 2 Midnights     Order Specific Question:   Certification     Answer:   I certify that inpatient services are medically necessary for this patient for a duration of greater than two midnights  See H&P and MD Progress Notes for additional information about the patient's course of treatment  ED Arrival Information     Expected   -    Arrival   6/10/2023 02:16    Acuity   Urgent            Means of arrival   Walk-In    Escorted by   [place]    Service   Hospitalist    Admission type   Emergency            Arrival complaint   Chest pain, SOB           Chief Complaint   Patient presents with   • Chest Pain     Patient c/o chest pain and sob after doing meth, has scratch marks from self on left side abdomen, patient feels very out of breath and can't give detailed information       Initial Presentation: 27 y o  male presents to ed from skilled nursing  for evaluation and treatment of chest pain with shortness of breath  Clinical assessment significant for temp 97,2m hypertension and tachypnea  UDS : +amh/meth, +THC, D-dimer 0 51, Trop 543, , K 2 9, An gap 18, CR1 33, WBC 18 84  Imaging shows diffuse gallbladder wall thickening  EKG sinus bradycardia  Initially treated with iv  9% ns bolus, aspirin, nitrobid x1, iv heparin gtt, iv ceftriaxone  Admit to inpatient med surg for treatment of chest pain, gallbladder thickening, possible gastritis    Plan to consult cardiology, trend troponin, Echo, telemetry, aspirin, atrovstatin, heparin gtt, CTA to rule out dissection, trend electrolytes, iv antibiotics  Date: 6-11-23   Day 2: inpatient med surg    Cardiology consult completed  NSTEMI, No acute ischemic changes on EKG  Continue aspirin, iv heparin gtt  Monitor on telemetry-nsr  Troponin 985 > 535  CTA with diffuse gallbladder wall thickening  No dissection  Continue iv ceftriaxone, iv flagyl  Iv  9% ns 125/hr  ED Triage Vitals   06/10/23 0242 06/10/23 0222 06/10/23 0222 06/10/23 0222 06/10/23 0222   (!) 97 2°F   (36 2 °C) 64 (!) 24 169/93 99 %      Tympanic Monitor         Pain Score       4          06/10/23 84 4 kg (186 lb)     Additional Vital Signs:     Patient Vitals for the past 24 hrs:   BP Temp Pulse Resp SpO2   06/11/23 0508 139/79 -- 87 -- 98 %   06/11/23 0305 114/80 98 3 °F (36 8 °C) 104 19 97 %   06/10/23 2220 143/87 98 7 °F (37 1 °C) 90 -- 99 %   06/10/23 1959 160/96 98 °F (36 7 °C) 89 18 98 %   06/10/23 1527 130/88 97 9 °F (36 6 °C) 68 18 100 %   06/10/23 1439 119/69 -- (!) 106 -- 99 %   06/10/23 1326 (!) 158/106 -- 85 18 100 %   06/10/23 1326 (!) 158/106 -- -- -- --     Pertinent Labs/Diagnostic Test Results:     ECHO  6-10-23     •  Left Ventricle: Left ventricular cavity size is normal  Wall thickness is normal  Systolic function is normal   Estimated ejection fraction is 50-55%  Although no diagnostic regional wall motion abnormality was identified, this possibility cannot be completely excluded on the basis of this study  Diastolic function is normal   •  Right Ventricle: Right ventricular cavity size is mildly dilated  Systolic function is normal   •  Right Atrium: The atrium is mildly dilated  •  Mitral Valve: There is mild regurgitation  •  Tricuspid Valve: There is trace regurgitation  •  Pulmonic Valve: There is trace regurgitation      CTA dissection protocol chest/abdomen/pelvis   Final  (06/10 1425)      Trace amount of complex abnormal fluid in the pelvis for a male patient could relate to infectious/inflammatory process or other etiology, correlate clinically  No evidence of intramural hematoma, dissection or aneurysm  Persistent diffuse gallbladder wall thickening as seen on ultrasound  US right upper quadrant   Final (06/10 1053)      Diffuse gallbladder wall thickening similar to CT  Ahmadi's sign is negative as patient is medicated  Acute cholecystitis is still in the differential and further management based on clinical criteria  Workstation performed: [id number]         CT abdomen pelvis with contrast   Final  (06/10 0610)      1  Diffuse gallbladder wall thickening and pericholecystic edema noted without calcified gallstones  Recommend right upper quadrant ultrasound for better evaluation  No significant intrahepatic or extrahepatic biliary ductal dilatation  2   Subtle diffuse wall thickening of the stomach, descending colon, sigmoid colon, and rectum could be related to under distention versus gastritis/colitis  Recommend clinical correlation  3   No evidence for bowel obstruction, appendicitis, obstructive uropathy, or free air  4   Small amount of nonspecific pathological free fluid in the pelvis of uncertain etiology  XR chest 1 view portable   Final  (06/10 0729)      No acute cardiopulmonary disease        Findings are stable            Results from last 7 days   Lab Units 06/11/23  0419 06/10/23  0228   HEMATOCRIT % 42 2 43 5   HEMOGLOBIN g/dL 14 8 15 4   NEUTROS ABS Thousands/µL  --  16 47*   PLATELETS Thousands/uL 191 227   WBC Thousand/uL 14 05* 18 84*         Results from last 7 days   Lab Units 06/11/23  0419 06/10/23  0228   ANION GAP mmol/L 10 18*   BUN mg/dL 13 25   CALCIUM mg/dL 8 9 9 8   CHLORIDE mmol/L 104 95*   CO2 mmol/L 20* 19*   CREATININE mg/dL 1 17 1 33*   EGFR ml/min/1 73sq m 80 69   POTASSIUM mmol/L 3 7 2 9*   MAGNESIUM mg/dL 2 0 1 7*   SODIUM mmol/L 134* 132*     Results from last 7 days   Lab Units 06/11/23  0419 06/10/23  0228   ALBUMIN g/dL 4 3 5 1*   ALK PHOS U/L 44 42   ALT U/L 26 31   AST U/L 41* 45*   BILIRUBIN DIRECT mg/dL  --  0 14   TOTAL BILIRUBIN mg/dL 1 44* 0 72   TOTAL PROTEIN g/dL 7 0 8 1         Results from last 7 days   Lab Units 06/11/23  0419 06/10/23  0228   GLUCOSE RANDOM mg/dL 98 156*             BETA-HYDROXYBUTYRATE   Date Value Ref Range Status   06/10/2023 0 1 <0 6 mmol/L Final        Results from last 7 days   Lab Units 06/11/23  0419 06/10/23  0228   CK TOTAL U/L 535* 989*     Results from last 7 days   Lab Units 06/10/23  0632 06/10/23  0437 06/10/23  0228   HS TNI 0HR ng/L  --   --  543*   HS TNI 2HR ng/L  --  1,219*  --    HS TNI 4HR ng/L 2,094*  --   --    HSTNI D2 ng/L  --  676*  --    HSTNI D4 ng/L 1,551*  --   --      Results from last 7 days   Lab Units 06/10/23  0228   D-DIMER QUANTITATIVE ug/ml FEU 0 51*     Results from last 7 days   Lab Units 06/11/23  0419 06/10/23  2145 06/10/23  1409 06/10/23  0228   INR   --   --   --  1 13   PROTIME seconds  --   --   --  14 6*   PTT seconds 88* 59* 35 28         Results from last 7 days   Lab Units 06/10/23  0228   PROCALCITONIN ng/ml <0 05     Results from last 7 days   Lab Units 06/10/23  1805 06/10/23  1409   LACTIC ACID mmol/L 1 4 3 8*       Results from last 7 days   Lab Units 06/10/23  1409   BNP pg/mL 330*         Results from last 7 days   Lab Units 06/10/23  0310   BACTERIA UA /hpf Occasional   BILIRUBIN UA  Negative   BLOOD UA  Negative   CLARITY UA  Clear   COLOR UA  Yellow   EPITHELIAL CELLS WET PREP /hpf Occasional   GLUCOSE UA mg/dl Negative   KETONES UA mg/dl 15 (1+)*   LEUKOCYTES UA  Negative   NITRITE UA  Negative   PH UA  7 0   PROTEIN UA mg/dl 100 (2+)*   RBC UA /hpf 0-1   SPEC GRAV UA  1 015   UROBILINOGEN UA E U /dl 0 2   WBC UA /hpf 0-1       Results from last 7 days   Lab Units 06/10/23  0310   AMPH/METH  Positive*   BARBITURATE UR  Negative   BENZODIAZEPINE UR  Negative   COCAINE UR  Negative   METHADONE URINE  Negative   OPIATE UR  Negative   PCP UR  Negative   THC UR  Positive*     Results from last 7 days   Lab Units 06/10/23  1409   ETHANOL LVL mg/dL <10       ED Treatment:   Medication Administration from 06/10/2023 0215 to 06/10/2023 0751       Date/Time Order Dose Route Action     06/10/2023 0239 EDT sodium chloride 0 9 % bolus 1,000 mL 1,000 mL Intravenous New Bag     06/10/2023 0330 EDT aspirin chewable tablet 324 mg 324 mg Oral Given     06/10/2023 0331 EDT nitroglycerin (NITRO-BID) 2 % TD ointment 1 inch 1 inch Topical Given     06/10/2023 0333 EDT midazolam (VERSED) injection 2 mg 2 mg Intravenous Given     06/10/2023 0558 EDT heparin (porcine) injection 4,000 Units 4,000 Units Intravenous Given     06/10/2023 0606 EDT heparin (porcine) 25,000 units in 0 45% NaCl 250 mL infusion (premix) 12 Units/kg/hr Intravenous New Bag     06/10/2023 0650 EDT cefTRIAXone (ROCEPHIN) IVPB (premix in dextrose) 1,000 mg 50 mL 1,000 mg Intravenous New Bag        History reviewed  No pertinent past medical history      Present on Admission:  none      Admitting Diagnosis:     Chest pain [R07 9]  Abdominal pain [R10 9]  Methamphetamine abuse (HCC) [F15 10]  NSTEMI (non-ST elevated myocardial infarction) ([facility] [address] ) [I21 4]    Age/Sex: 27 y o  male    Scheduled Medications:    aspirin, 81 mg, Oral, Daily  atorvastatin, 80 mg, Oral, QPM  cefTRIAXone, 1,000 mg, Intravenous, Q24H  diltiazem, 30 mg, Oral, Q8H [address]  magnesium Oxide, 400 mg, Oral, BID  metroNIDAZOLE, 500 mg, Intravenous, Q8H  pantoprazole, 40 mg, Oral, Early Morning  polyethylene glycol, 17 g, Oral, Daily  potassium chloride, 20 mEq, Oral, Daily      Continuous IV Infusions:  heparin (porcine), 3-20 Units/kg/hr (Order-Specific), Intravenous, Titrated  sodium chloride, 125 mL/hr, Intravenous, Continuous      PRN Meds:  heparin (porcine), 2,000 Units, Intravenous, Q6H PRN  heparin (porcine), 4,000 Units, Intravenous, Q6H PRN  hydrOXYzine HCL, 25 mg, Oral, Q6H PRN  ketorolac, 15 mg, Intravenous, Q6H PRN  nitroglycerin, 0 4 mg, Sublingual, Q5 Min PRN  ondansetron, 4 mg, Intravenous, Q6H PRN        IP CONSULT TO CARDIOLOGY    Network Utilization Review Department  ATTENTION: Please call with any questions or concerns to [phone] and carefully listen to the prompts so that you are directed to the right person  All voicemails are confidential   Cyntha Fairly all requests for admission clinical reviews, approved or denied determinations and any other requests to dedicated fax number below belonging to the campus where the patient is receiving treatment   List of dedicated fax numbers for the Facilities:  [address] DENIALS (Administrative/Medical Necessity) [phone]   [address] (Maternity/NICU/Pediatrics) [phone]   91 Kassandra Bateman [phone]   Onielandrei Kelly 77 [phone]   [address] [address] Bertha Velazquez 28 [phone]   1550 [facility] Eboni Breaux [place] [address] 815 [facility] [phone]

## 2023-06-11 NOTE — ASSESSMENT & PLAN NOTE
Asymptomatic  · CT abdomen:Diffuse gallbladder wall thickening and pericholecystic edema noted without calcified gallstones  Recommend right upper quadrant ultrasound for better evaluation  No significant intrahepatic or extrahepatic biliary ductal dilatation    · RUQ US-pending  · Hepatitis panel-pending

## 2023-06-11 NOTE — ASSESSMENT & PLAN NOTE
Currently asymptomatic      Low risk:   /   DOLORES Risk Score    Flowsheet Row Most Recent Value   Age >= 72 0 Filed at: 06/10/2023 4180   Known CAD (stenosis >= 50%) 0 Filed at: 06/10/2023 0627   Recent (<=24 hrs) Service Angina 1 Filed at: 06/10/2023 0921   ST Deviation >= 0 5 mm 0 Filed at: 06/10/2023 0087   3+ CAD Risk Factors (FHx, HTN, HLP, DM, Smoker) 0 Filed at: 06/10/2023 4388   Aspirin Use Past 7 Days 0 Filed at: 06/10/2023 6278   Elevated Cardiac Markers 1 Filed at: 06/10/2023 5619   DOLORES Risk Score (Calculated) 2 Filed at: 06/10/2023 0196        • EKG: Sinus bradycardia  • CXR-No acute cardiopulmonary disease  Serial Troponin I  trending down  Likely type 2 MI secondary Meth use vs ACS  Echo: Pending  Telemetry in place  • CBC, CMP- trending   • BNP-pending  • OhR1e-qmixolt%,   • Lipid Panel-pending  • Telemetry in place  • Meds: As needed morphine, nitrates, supplemental oxygen  • Cardiology recs:  o Continue aspirin, IV heparin, atorvastatin, p o   Cardizem  o Maintain adequate potassium and magnesium balance  o DC IV heparin in a m  if troponin is still receding  o Pharmacologic nuclear stress test after checking above result  o Calcium channel blocker

## 2023-06-11 NOTE — ASSESSMENT & PLAN NOTE
Improving  · K+-2 9, replenished in ED will continue  · NA-132/CL-95, continue IVF  · Mag-1 7, replenish and monitor  · Trend

## 2023-06-11 NOTE — PROGRESS NOTES
"Progress Note - Cardiology   [facility]     Ngozi Shipman 27 y o  male MRN: [id number]  : 1990  Unit/Bed#: [address] [place] [address] Encounter: [id number]    ASSESSMENT:  Elevated cardiac troponin following methamphetamine abuse  Type II versus type I MI  Presented with chest pain   Complains of abdominal discomfort and bloating/gas  No acute ischemic changes on EKG  Elevated at hsTNI, peak so far --> 985  CTA negative for acute aortic pathology  Trace amount of complex abnormal fluid in the pelvis for a male patient could relate to infectious/inflammatory process or other etiology, correlate clinically      No evidence of intramural hematoma, dissection or aneurysm      Persistent diffuse gallbladder wall thickening as seen on ultrasound      History of methamphetamine and pot abuse  Hypokalemia and hypomagnesemia, corrected  Elevated creatinine of 1 33--> normalized  Elevated AST              RECOMMENDATIONS:    Continue aspirin, IV heparin, atorvastatin, p o  Cardizem  Maintain adequate potassium and magnesium balance  DC IV heparin in a m  if troponin is still receding  Pharmacologic nuclear stress test after checking above result          Subjective / Objective:     Review of Systems   In good spirits  Complains of abdominal bloating/gas  Denies any significant chest pain or dyspnea  Vitals: Blood pressure 137/80, pulse 80, temperature 98 3 °F (36 8 °C), temperature source Oral, resp  rate 18, height 5' 11\" (1 803 m), weight 84 4 kg (186 lb), SpO2 100 %  Vitals:    06/10/23 0222 06/10/23 0842   Weight: 84 4 kg (186 lb) 84 4 kg (186 lb)     Body mass index is 25 94 kg/m²    BP Readings from Last 3 Encounters:   23 137/80   21 126/70   10/30/16 138/70     Orthostatic Blood Pressures    Flowsheet Row Most Recent Value   Blood Pressure 137/80 filed at 2023 1145   Patient Position - Orthostatic VS Lying filed at 2023 0904        I/O        0701  06/10 07 " 06/10 0701  06/11 0700 06/11 0701  06/12 0700    IV Piggyback 1000      Total Intake(mL/kg) 1000 (11 8)      Urine (mL/kg/hr) 400  425 (1 2)    Total Output 400  425    Net +600  -425               Invasive Devices     Peripheral Intravenous Line  Duration           Peripheral IV 06/10/23 Left;Lateral Antecubital 1 day    Peripheral IV 06/10/23 Right;Upper Arm 1 day                  Intake/Output Summary (Last 24 hours) at 6/11/2023 1111  Last data filed at 6/11/2023 0759  Gross per 24 hour   Intake --   Output 425 ml   Net -425 ml         Physical Exam:   Physical Exam    Neurologic:  Alert & oriented x 3, no new focal deficits, Not in any acute distress,  Constitutional:  Well developed, well nourished, non-toxic appearance   Eyes:  Pupil equal and reacting to light, conjunctiva normal   HENT:  Atraumatic, oropharynx moist, Neck- normal range of motion, no tenderness, supple   Respiratory:  Bilateral air entry, mostly clear to auscultation  Cardiovascular: S1-S2 regular rhythm, tachycardic with a 1/6 systolic murmur  GI:  Soft, nondistended, normal bowel sounds, nontender, no hepatosplenomegaly appreciated  Musculoskeletal:  no tenderness, no deformities     Skin:  Well hydrated, no rash   Lymphatic:  No lymphadenopathy noted   Extremities: No edema                Medications/ Allergies:     Current Facility-Administered Medications   Medication Dose Route Frequency Provider Last Rate   • aspirin  81 mg Oral Daily Mia Essex, MD     • atorvastatin  80 mg Oral QPM Mia Essex, MD     • cefTRIAXone  1,000 mg Intravenous Q24H MYLA Baker 1,000 mg (06/11/23 0533)   • diltiazem  30 mg Oral Q8H [facility] Love Sever, MD     • heparin (porcine)  3-20 Units/kg/hr (Order-Specific) Intravenous Titrated Korin Jay MD 18 Units/kg/hr (06/11/23 0754)   • heparin (porcine)  2,000 Units Intravenous Q6H PRN Korin Jay MD     • heparin (porcine)  4,000 Units Intravenous Q6H PRN Korin Jay MD     • hydrOXYzine HCL  25 mg Oral Q6H PRN MYLA Baker     • ketorolac  15 mg Intravenous Q6H PRN MYLA Baker     • magnesium Oxide  400 mg Oral BID Ally Wolff MD     • metroNIDAZOLE  500 mg Intravenous Q8H MYLA Baker 500 mg (06/11/23 1011)   • nitroglycerin  0 4 mg Sublingual Q5 Min PRN Ally Wolff MD     • ondansetron  4 mg Intravenous Q6H PRN Ally Wolff MD     • pantoprazole  40 mg Oral Early Morning Ally Wolff MD     • polyethylene glycol  17 g Oral Daily Ally Wolff MD     • potassium chloride  20 mEq Oral Daily Ally Wolff MD     • sodium chloride  125 mL/hr Intravenous Continuous Ally Wolff  mL/hr (06/11/23 0505)     heparin (porcine), 2,000 Units, Q6H PRN  heparin (porcine), 4,000 Units, Q6H PRN  hydrOXYzine HCL, 25 mg, Q6H PRN  ketorolac, 15 mg, Q6H PRN  nitroglycerin, 0 4 mg, Q5 Min PRN  ondansetron, 4 mg, Q6H PRN      No Known Allergies        Labs:   Troponins:  Results from last 7 days   Lab Units 06/11/23  0419 06/10/23  0228   CK TOTAL U/L 535* 989*       CBC with diff:  Results from last 7 days   Lab Units 06/11/23  0419 06/10/23  0228   HEMATOCRIT % 42 2 43 5   HEMOGLOBIN g/dL 14 8 15 4   MCH pg 31 6 31 3   MCHC g/dL 35 1 35 4   MCV fL 90 88   MPV fL 11 5 11 6   NRBC AUTO /100 WBCs  --  0   PLATELETS Thousands/uL 191 227   RBC Million/uL 4 68 4 92   RDW % 12 9 11 9   WBC Thousand/uL 14 05* 18 84*       CMP:  Results from last 7 days   Lab Units 06/11/23  0419 06/10/23  0228   ANION GAP mmol/L 10 18*   ALBUMIN g/dL 4 3 5 1*   ALK PHOS U/L 44 42   ALT U/L 26 31   AST U/L 41* 45*   BUN mg/dL 13 25   CALCIUM mg/dL 8 9 9 8   CHLORIDE mmol/L 104 95*   CO2 mmol/L 20* 19*   CREATININE mg/dL 1 17 1 33*   EGFR ml/min/1 73sq m 80 69   POTASSIUM mmol/L 3 7 2 9*   SODIUM mmol/L 134* 132*   TOTAL BILIRUBIN mg/dL 1 44* 0 72   TOTAL PROTEIN g/dL 7 0 8 1       Magnesium:  Results from last 7 days   Lab Units 06/11/23  0419 06/10/23  0228   MAGNESIUM mg/dL 2 0 1 7* "    Coags:  Results from last 7 days   Lab Units 06/11/23  1034 06/11/23  0419 06/10/23  2145 06/10/23  1409 06/10/23  0228   INR   --   --   --   --  1 13   PTT seconds 88* 88* 59* 35 28     TSH:    No components found for: \"TSH3\"  Lipid Profile:  Results from last 7 days   Lab Units 06/10/23  0228   CHOLESTEROL mg/dL 159   HDL mg/dL 62   LDL CALC mg/dL 84   TRIGLYCERIDES mg/dL 66     Hgb A1c:    NT-proBNP: No results for input(s): \"NTBNP\" in the last 72 hours  Imaging & Testing   I have personally reviewed pertinent reports  CTA dissection protocol chest/abdomen/pelvis    Result Date: 6/10/2023  Narrative: CTA - CHEST, ABDOMEN AND PELVIS - WITHOUT AND WITH IV CONTRAST INDICATION:   Meth use with chest pain, abdominal pain  COMPARISON: Same date CT and ultrasound  TECHNIQUE: CT examination of the chest, abdomen and pelvis was performed both prior to and after the administration of intravenous contrast    The noncontrast portion of this examination was performed utilizing low radiation dose technique  Thin section angiographic arterial phase post contrast technique was used in order to evaluate for aortic dissection  3D reformatted images and volume rendering were performed on an independent workstation  Multiplanar 2D reformatted images were created from the source data  Radiation dose length product (DLP) for this visit:  0434 mGy-cm   This examination, like all CT scans performed in the [facility], was performed utilizing techniques to minimize radiation dose exposure, including the use of iterative reconstruction and automated exposure control  IV Contrast:  100 mL of iohexol (OMNIPAQUE) Enteric Contrast:  Enteric contrast was not administered  FINDINGS: AORTA:  There is no aortic dissection or intramural hematoma  There is no aortic aneurysm  No significant atherosclerotic disease    Specifically, no flow limiting atherosclerotic stenosis of aorta or major aortic branch vessel in the " chest, abdomen or pelvis  CHEST LUNGS:  Lungs are clear  There is no tracheal or endobronchial lesion  PLEURA:  Unremarkable  HEART/PULMONARY ARTERIAL TREE:  Unremarkable for patient's age  MEDIASTINUM AND EDUARDO:  Unremarkable  CHEST WALL AND LOWER NECK:  Unremarkable  ABDOMEN LIVER/BILIARY TREE:  Unremarkable  GALLBLADDER: Gallbladder wall thickening as seen on ultrasound performed today  SPLEEN:  Unremarkable  PANCREAS:  Unremarkable  ADRENAL GLANDS:  Unremarkable  KIDNEYS/URETERS:  Unremarkable  No hydronephrosis  STOMACH AND BOWEL:  Unremarkable  APPENDIX:  No findings to suggest appendicitis  ABDOMINOPELVIC CAVITY: Abnormal small fluid in the pelvis measuring approximately 30 Hounsfield units, abnormal for male patient could relate to infectious/inflammatory etiology PELVIS REPRODUCTIVE ORGANS:  Unremarkable for patient's age  URINARY BLADDER:  Unremarkable  ABDOMINAL WALL/INGUINAL REGIONS:  Unremarkable  OSSEOUS STRUCTURES:  No acute fracture or destructive osseous lesion  Impression: Trace amount of complex abnormal fluid in the pelvis for a male patient could relate to infectious/inflammatory process or other etiology, correlate clinically  No evidence of intramural hematoma, dissection or aneurysm  Persistent diffuse gallbladder wall thickening as seen on ultrasound  Workstation performed: [id number]     Echo complete w/ contrast if indicated    Result Date: 6/10/2023  Narrative: •  Left Ventricle: Left ventricular cavity size is normal  Wall thickness is normal  Systolic function is normal   Estimated ejection fraction is 50-55%  Although no diagnostic regional wall motion abnormality was identified, this possibility cannot be completely excluded on the basis of this study  Diastolic function is normal  •  Right Ventricle: Right ventricular cavity size is mildly dilated  Systolic function is normal  •  Right Atrium: The atrium is mildly dilated  •  Mitral Valve: There is mild regurgitation   • Tricuspid Valve: There is trace regurgitation  •  Pulmonic Valve: There is trace regurgitation  US right upper quadrant    Result Date: 6/10/2023  Narrative: RIGHT UPPER QUADRANT ULTRASOUND INDICATION:     r/o jovana  COMPARISON:  None TECHNIQUE:   Real-time ultrasound of the right upper quadrant was performed with a curvilinear transducer with both volumetric sweeps and still imaging techniques  FINDINGS: PANCREAS:  Visualized portions of the pancreas are within normal limits  AORTA AND IVC:  Visualized portions are normal for patient age  LIVER: Size:  Within normal range  The liver measures 15 5 cm in the midclavicular line  Contour:  Surface contour is smooth  Parenchyma:  Echogenicity and echotexture are within normal limits  No liver mass identified  Limited imaging of the main portal vein shows it to be patent and hepatopetal  BILIARY: Diffuse gallbladder wall thickening measuring 11 mm  Johnnye Charan sign is negative, however patient is medicated  No intrahepatic biliary dilatation  CBD measures 5 0 mm  No choledocholithiasis  KIDNEY: Right kidney measures 11 8 x 5 4 x 5 0 cm  Volume 168 3 mL Kidney within normal limits  ASCITES:   None  Impression: Diffuse gallbladder wall thickening similar to CT  Ahmadi's sign is negative as patient is medicated  Acute cholecystitis is still in the differential and further management based on clinical criteria  Workstation performed: [id number]     XR chest 1 view portable    Result Date: 6/10/2023  Narrative: CHEST INDICATION:   chest pain sob  COMPARISON: 3/5/2016 CT EXAM PERFORMED/VIEWS:  XR CHEST PORTABLE Single view FINDINGS: Cardiomediastinal silhouette appears unremarkable  The lungs are clear  No pneumothorax or pleural effusion  Osseous structures appear within normal limits for patient age  Impression: No acute cardiopulmonary disease   Findings are stable Workstation performed: [id number]     CT abdomen pelvis with contrast    Result Date: 6/10/2023  Narrative: CT ABDOMEN AND PELVIS WITH IV CONTRAST INDICATION:   r/o appendicitis, low abd pain, also on meth and can't provide hx  COMPARISON: 3/5/2016  TECHNIQUE:  CT examination of the abdomen and pelvis was performed  Multiplanar 2D reformatted images were created from the source data  This examination, like all CT scans performed in the [facility], was performed utilizing techniques to minimize radiation dose exposure, including the use of iterative reconstruction and automated exposure control  Radiation dose length product (DLP) for this visit:  484 mGy-cm IV Contrast:  100 mL of iohexol (OMNIPAQUE) Enteric Contrast:  Enteric contrast was not administered  FINDINGS: Exam limited by motion artifact  ABDOMEN LOWER CHEST:  No clinically significant abnormality identified in the visualized lower chest  LIVER/BILIARY TREE:  Unremarkable  GALLBLADDER: No calcified gallstones  Diffuse gallbladder wall thickening and pericholecystic edema noted  Recommend right upper quadrant ultrasound for better evaluation  No significant intrahepatic or extrahepatic biliary ductal dilatation  SPLEEN:  Unremarkable  PANCREAS:  Unremarkable  ADRENAL GLANDS:  Unremarkable  KIDNEYS/URETERS:  Unremarkable  No hydronephrosis  STOMACH AND BOWEL: Stomach is mildly distended with air and fluid  No intraluminal mass seen  Subtle diffuse wall thickening of the stomach could be due to incomplete distention or nonspecific gastritis  Small and large bowel loops appear normal in course and caliber without evidence for obstruction  Terminal ileum and appendix are unremarkable  Left colon is predominantly contracted limiting evaluation  Wall thickening of the descending and sigmoid colon could be due to under distention or focal colitis  No pericolonic free air or abscess/fluid collection  APPENDIX:  No findings to suggest appendicitis   ABDOMINOPELVIC CAVITY: Small amount of nonspecific lower pelvic free fluid noted "which is pathologic in a male patient  No pneumoperitoneum  No lymphadenopathy  VESSELS:  Unremarkable for patient's age  PELVIS REPRODUCTIVE ORGANS:  Unremarkable for patient's age  URINARY BLADDER: Moderately distended and grossly unremarkable    ABDOMINAL WALL/INGUINAL REGIONS:  Unremarkable  OSSEOUS STRUCTURES:  No acute fracture or destructive osseous lesion  Impression: 1  Diffuse gallbladder wall thickening and pericholecystic edema noted without calcified gallstones  Recommend right upper quadrant ultrasound for better evaluation  No significant intrahepatic or extrahepatic biliary ductal dilatation  2   Subtle diffuse wall thickening of the stomach, descending colon, sigmoid colon, and rectum could be related to under distention versus gastritis/colitis  Recommend clinical correlation  3   No evidence for bowel obstruction, appendicitis, obstructive uropathy, or free air  4   Small amount of nonspecific pathological free fluid in the pelvis of uncertain etiology  Workstation performed: [id number]        EKG / Monitor: Personally reviewed  Sinus rhythm at 99/min    Cardiac testing:   TTE:  EF 50-55%,          Dr Erick Patel MD, [facility]      \"This note has been constructed using a voice recognition system  Therefore there may be syntax, spelling, and/or grammatical errors  Please call if you have any questions   \"  " Partial Purse String (Intermediate) Text: Given the location of the defect and the characteristics of the surrounding skin an intermediate purse string closure was deemed most appropriate.  Undermining was performed circumferentially around the surgical defect.  A purse string suture was then placed and tightened. Wound tension of the circular defect prevented complete closure of the wound.

## 2023-06-11 NOTE — PLAN OF CARE
Problem: PAIN - ADULT  Goal: Verbalizes/displays adequate comfort level or baseline comfort level  Description: Interventions:  - Encourage patient to monitor pain and request assistance  - Assess pain using appropriate pain scale  - Administer analgesics based on type and severity of pain and evaluate response  - Implement non-pharmacological measures as appropriate and evaluate response  - Consider cultural and social influences on pain and pain management  - Notify physician/advanced practitioner if interventions unsuccessful or patient reports new pain  6/11/2023 1830 by Catina Cloud RN  Outcome: Progressing  6/11/2023 1827 by Caitna Cloud RN  Outcome: Progressing     Problem: INFECTION - ADULT  Goal: Absence or prevention of progression during hospitalization  Description: INTERVENTIONS:  - Assess and monitor for signs and symptoms of infection  - Monitor lab/diagnostic results  - Monitor all insertion sites, i e  indwelling lines, tubes, and drains  - Monitor endotracheal if appropriate and nasal secretions for changes in amount and color  - Henrietta appropriate cooling/warming therapies per order  - Administer medications as ordered  - Instruct and encourage patient and family to use good hand hygiene technique  - Identify and instruct in appropriate isolation precautions for identified infection/condition  6/11/2023 1830 by Catina Cloud RN  Outcome: Progressing  6/11/2023 1827 by Catina Cloud RN  Outcome: Progressing  Goal: Absence of fever/infection during neutropenic period  Description: INTERVENTIONS:  - Monitor WBC    Outcome: Completed     Problem: SAFETY ADULT  Goal: Patient will remain free of falls  Description: INTERVENTIONS:  - Educate patient/family on patient safety including physical limitations  - Instruct patient to call for assistance with activity   - Consult OT/PT to assist with strengthening/mobility   - Keep Call bell within reach  - Keep bed low and locked with side rails adjusted as appropriate  - Keep care items and personal belongings within reach  - Initiate and maintain comfort rounds  - Make Fall Risk Sign visible to staff  - Apply yellow socks and bracelet for high fall risk patients  - Consider moving patient to room near nurses station  6/11/2023 1830 by Rebeca Norris RN  Outcome: Progressing  6/11/2023 1827 by Rebeca Norris RN  Outcome: Progressing  Goal: Maintain or return to baseline ADL function  Description: INTERVENTIONS:  -  Assess patient's ability to carry out ADLs; assess patient's baseline for ADL function and identify physical deficits which impact ability to perform ADLs (bathing, care of mouth/teeth, toileting, grooming, dressing, etc )  - Assess/evaluate cause of self-care deficits   - Assess range of motion  - Assess patient's mobility; develop plan if impaired  - Assess patient's need for assistive devices and provide as appropriate  - Encourage maximum independence but intervene and supervise when necessary  - Involve family in performance of ADLs  - Assess for home care needs following discharge   - Consider OT consult to assist with ADL evaluation and planning for discharge  - Provide patient education as appropriate  6/11/2023 1830 by Rebeca Norris RN  Outcome: Progressing  6/11/2023 1827 by Rebeca Norris RN  Outcome: Progressing  Goal: Maintains/Returns to pre admission functional level  Description: INTERVENTIONS:  - Perform BMAT or MOVE assessment daily    - Set and communicate daily mobility goal to care team and patient/family/caregiver     - Out of bed for toileting  - Record patient progress and toleration of activity level   6/11/2023 1830 by Rebeca Norris RN  Outcome: Progressing  6/11/2023 1827 by Rebeca Norris RN  Outcome: Progressing

## 2023-06-12 ENCOUNTER — APPOINTMENT (INPATIENT)
Dept: RADIOLOGY | Facility: HOSPITAL | Age: 33
DRG: 720 | End: 2023-06-12
Payer: COMMERCIAL

## 2023-06-12 ENCOUNTER — PREP FOR PROCEDURE (OUTPATIENT)
Dept: OTHER | Facility: HOSPITAL | Age: 33
End: 2023-06-12

## 2023-06-12 DIAGNOSIS — R07.9 CHEST PAIN, UNSPECIFIED TYPE: Primary | ICD-10-CM

## 2023-06-12 PROBLEM — F41.8 OTHER SPECIFIED ANXIETY DISORDERS: Status: ACTIVE | Noted: 2023-06-12

## 2023-06-12 LAB
ALBUMIN SERPL BCP-MCNC: 3.9 G/DL (ref 3.5–5)
ALP SERPL-CCNC: 39 U/L (ref 34–104)
ALT SERPL W P-5'-P-CCNC: 23 U/L (ref 7–52)
ANION GAP SERPL CALCULATED.3IONS-SCNC: 7 MMOL/L (ref 4–13)
APTT PPP: 105 SECONDS (ref 23–37)
APTT PPP: 94 SECONDS (ref 23–37)
AST SERPL W P-5'-P-CCNC: 30 U/L (ref 13–39)
BILIRUB SERPL-MCNC: 1.14 MG/DL (ref 0.2–1)
BUN SERPL-MCNC: 14 MG/DL (ref 5–25)
CALCIUM SERPL-MCNC: 8.5 MG/DL (ref 8.4–10.2)
CARDIAC TROPONIN I PNL SERPL HS: 123 NG/L (ref 8–18)
CHEST PAIN STATEMENT: NORMAL
CHLORIDE SERPL-SCNC: 109 MMOL/L (ref 96–108)
CO2 SERPL-SCNC: 19 MMOL/L (ref 21–32)
CREAT SERPL-MCNC: 1.05 MG/DL (ref 0.6–1.3)
ERYTHROCYTE [DISTWIDTH] IN BLOOD BY AUTOMATED COUNT: 13 % (ref 11.6–15.1)
GFR SERPL CREATININE-BSD FRML MDRD: 92 ML/MIN/1.73SQ M
GLUCOSE SERPL-MCNC: 87 MG/DL (ref 65–140)
HAV IGM SER QL: ABNORMAL
HBV CORE IGM SER QL: ABNORMAL
HBV SURFACE AG SER QL: ABNORMAL
HCT VFR BLD AUTO: 43.3 % (ref 36.5–49.3)
HCV AB SER QL: REACTIVE
HGB BLD-MCNC: 14.6 G/DL (ref 12–17)
MAX DIASTOLIC BP: 76 MMHG
MAX HEART RATE: 133 BPM
MAX PREDICTED HEART RATE: 186 BPM
MAX. SYSTOLIC BP: 142 MMHG
MCH RBC QN AUTO: 31.2 PG (ref 26.8–34.3)
MCHC RBC AUTO-ENTMCNC: 33.7 G/DL (ref 31.4–37.4)
MCV RBC AUTO: 93 FL (ref 82–98)
PLATELET # BLD AUTO: 167 THOUSANDS/UL (ref 149–390)
PMV BLD AUTO: 11.5 FL (ref 8.9–12.7)
POTASSIUM SERPL-SCNC: 4.3 MMOL/L (ref 3.5–5.3)
PROT SERPL-MCNC: 6.4 G/DL (ref 6.4–8.4)
PROTOCOL NAME: NORMAL
RATE PRESSURE PRODUCT: NORMAL
RBC # BLD AUTO: 4.68 MILLION/UL (ref 3.88–5.62)
REASON FOR TERMINATION: NORMAL
SL CV REST NUCLEAR ISOTOPE DOSE: 10 MCI
SL CV STRESS NUCLEAR ISOTOPE DOSE: 32 MCI
SL CV STRESS RECOVERY BP: NORMAL MMHG
SL CV STRESS RECOVERY HR: 111 BPM
SL CV STRESS RECOVERY O2 SAT: 100 %
SODIUM SERPL-SCNC: 135 MMOL/L (ref 135–147)
STRESS ANGINA INDEX: 0
STRESS BASELINE BP: NORMAL MMHG
STRESS BASELINE HR: 98 BPM
STRESS O2 SAT REST: 96 %
STRESS PEAK HR: 123 BPM
STRESS POST O2 SAT PEAK: 100 %
STRESS POST PEAK BP: 148 MMHG
TARGET HR FORMULA: NORMAL
TEST INDICATION: NORMAL
TIME IN EXERCISE PHASE: NORMAL
WBC # BLD AUTO: 9.58 THOUSAND/UL (ref 4.31–10.16)

## 2023-06-12 PROCEDURE — 80053 COMPREHEN METABOLIC PANEL: CPT | Performed by: STUDENT IN AN ORGANIZED HEALTH CARE EDUCATION/TRAINING PROGRAM

## 2023-06-12 PROCEDURE — 85027 COMPLETE CBC AUTOMATED: CPT | Performed by: STUDENT IN AN ORGANIZED HEALTH CARE EDUCATION/TRAINING PROGRAM

## 2023-06-12 PROCEDURE — 78452 HT MUSCLE IMAGE SPECT MULT: CPT

## 2023-06-12 PROCEDURE — 99231 SBSQ HOSP IP/OBS SF/LOW 25: CPT | Performed by: STUDENT IN AN ORGANIZED HEALTH CARE EDUCATION/TRAINING PROGRAM

## 2023-06-12 PROCEDURE — 78452 HT MUSCLE IMAGE SPECT MULT: CPT | Performed by: INTERNAL MEDICINE

## 2023-06-12 PROCEDURE — 85730 THROMBOPLASTIN TIME PARTIAL: CPT | Performed by: STUDENT IN AN ORGANIZED HEALTH CARE EDUCATION/TRAINING PROGRAM

## 2023-06-12 PROCEDURE — G1004 CDSM NDSC: HCPCS

## 2023-06-12 PROCEDURE — 93017 CV STRESS TEST TRACING ONLY: CPT

## 2023-06-12 PROCEDURE — A9502 TC99M TETROFOSMIN: HCPCS

## 2023-06-12 PROCEDURE — 84484 ASSAY OF TROPONIN QUANT: CPT | Performed by: PHYSICIAN ASSISTANT

## 2023-06-12 PROCEDURE — 99232 SBSQ HOSP IP/OBS MODERATE 35: CPT | Performed by: INTERNAL MEDICINE

## 2023-06-12 PROCEDURE — 93018 CV STRESS TEST I&R ONLY: CPT | Performed by: INTERNAL MEDICINE

## 2023-06-12 PROCEDURE — 93016 CV STRESS TEST SUPVJ ONLY: CPT | Performed by: INTERNAL MEDICINE

## 2023-06-12 RX ORDER — METRONIDAZOLE 500 MG/1
500 TABLET ORAL EVERY 8 HOURS SCHEDULED
Status: DISCONTINUED | OUTPATIENT
Start: 2023-06-12 | End: 2023-06-13

## 2023-06-12 RX ORDER — REGADENOSON 0.08 MG/ML
0.4 INJECTION, SOLUTION INTRAVENOUS ONCE
Status: COMPLETED | OUTPATIENT
Start: 2023-06-12 | End: 2023-06-12

## 2023-06-12 RX ADMIN — POTASSIUM CHLORIDE 20 MEQ: 1500 TABLET, EXTENDED RELEASE ORAL at 10:50

## 2023-06-12 RX ADMIN — METRONIDAZOLE 500 MG: 500 TABLET ORAL at 18:26

## 2023-06-12 RX ADMIN — Medication 250 MG: at 10:50

## 2023-06-12 RX ADMIN — CEFTRIAXONE 1000 MG: 1 INJECTION, SOLUTION INTRAVENOUS at 05:42

## 2023-06-12 RX ADMIN — POLYETHYLENE GLYCOL 3350 17 G: 17 POWDER, FOR SOLUTION ORAL at 10:55

## 2023-06-12 RX ADMIN — DILTIAZEM HYDROCHLORIDE 30 MG: 30 TABLET, FILM COATED ORAL at 05:43

## 2023-06-12 RX ADMIN — MAGNESIUM OXIDE TAB 400 MG (241.3 MG ELEMENTAL MG) 400 MG: 400 (241.3 MG) TAB at 18:26

## 2023-06-12 RX ADMIN — SODIUM CHLORIDE 125 ML/HR: 0.9 INJECTION, SOLUTION INTRAVENOUS at 16:18

## 2023-06-12 RX ADMIN — SENNOSIDES AND DOCUSATE SODIUM 1 TABLET: 50; 8.6 TABLET ORAL at 21:30

## 2023-06-12 RX ADMIN — Medication 250 MG: at 18:26

## 2023-06-12 RX ADMIN — DILTIAZEM HYDROCHLORIDE 30 MG: 30 TABLET, FILM COATED ORAL at 21:30

## 2023-06-12 RX ADMIN — ASPIRIN 81 MG CHEWABLE TABLET 81 MG: 81 TABLET CHEWABLE at 10:50

## 2023-06-12 RX ADMIN — REGADENOSON 0.4 MG: 0.08 INJECTION, SOLUTION INTRAVENOUS at 12:25

## 2023-06-12 RX ADMIN — MAGNESIUM OXIDE TAB 400 MG (241.3 MG ELEMENTAL MG) 400 MG: 400 (241.3 MG) TAB at 10:50

## 2023-06-12 RX ADMIN — ATORVASTATIN CALCIUM 80 MG: 80 TABLET, FILM COATED ORAL at 18:26

## 2023-06-12 RX ADMIN — SODIUM CHLORIDE 125 ML/HR: 0.9 INJECTION, SOLUTION INTRAVENOUS at 07:56

## 2023-06-12 RX ADMIN — METRONIDAZOLE 500 MG: 500 INJECTION, SOLUTION INTRAVENOUS at 10:44

## 2023-06-12 RX ADMIN — HEPARIN SODIUM 16 UNITS/KG/HR: 10000 INJECTION, SOLUTION INTRAVENOUS at 15:22

## 2023-06-12 RX ADMIN — DILTIAZEM HYDROCHLORIDE 30 MG: 30 TABLET, FILM COATED ORAL at 15:18

## 2023-06-12 RX ADMIN — LORAZEPAM 0.5 MG: 2 INJECTION INTRAMUSCULAR; INTRAVENOUS at 07:55

## 2023-06-12 RX ADMIN — PANTOPRAZOLE SODIUM 40 MG: 40 TABLET, DELAYED RELEASE ORAL at 05:42

## 2023-06-12 RX ADMIN — METRONIDAZOLE 500 MG: 500 INJECTION, SOLUTION INTRAVENOUS at 00:02

## 2023-06-12 RX ADMIN — LORAZEPAM 0.5 MG: 2 INJECTION INTRAMUSCULAR; INTRAVENOUS at 18:26

## 2023-06-12 NOTE — PROGRESS NOTES
Patients family member calling RN into room that pt is extremely nervous and the masimo is constantly going off causing his anxiety to go through the roof  Pt requesting PRN ativan and masimo to be turned off  Pt educated on purpose of masmio and pt still requesting

## 2023-06-12 NOTE — PROGRESS NOTES
Progress Note - Cardiology   [facility]     Tamera Harding 27 y o  male MRN: [id number]  : 1990  Unit/Bed#: 23396 [facility] 405-01 Encounter: [id number]    Assessment and Plan:   1  Chest pain  - Presented on 6/10/23 for evaluation for chest pain  Reports use of meth on evening of 23   - 6/10/23 CTA dissection protocol chest/abdomen/pelvis: No evidence of intramural hematoma, dissection or aneurysm  - 6/10/23 hs troponin: 543 (0 hrs), 1219 (2hrs), 2094 (4hrs)  - 23 hs troponin random: 95   - 23 troponin pending   - Continue heparin drip until  troponin results, if troponin continues to downtrend will discontinue  - 6/10/23 TTE: LVEF 50-55%  Diastolic function is normal    - Scheduled for nuclear stress test today, 23    - Patient reports since admission he has not had any chest pain  - Telemetry reviewed: Patient currently in sinus rhythm, rate 85 bpm   Noted episodes of sinus tachycardia overnight   - Continue telemetry  2  Elevated troponin  - 6/10/23 hs troponin: 543 (0 hrs), 1219 (2hrs), 2094 (4hrs)  - 23 hs troponin random: 95   - 23 troponin pending   - Continue heparin drip until  troponin results, if troponin continues to downtrend will discontinue  - 6/10/23 TTE: LVEF 50-55%  Diastolic function is normal    - Scheduled for nuclear stress test today, 23  3  History of methamphetamine use  - Presented on 6/10/23 for evaluation for chest pain  Reports use of meth on evening of 23  Reported last use in    - 6/10/23 urine drug screen positive for methamphetamine and THC  - Care per primary team     4  Gallbladder thickening noted on CT     - 6/10/23 CT abd/pelvis w/ contrast: Diffuse gallbladder wall thickening and pericholecystic edema noted without calcified gallstones  No significant intrahepatic or extrahepatic biliary ductal dilatation    - 6/10/23 RUQ US: Diffuse gallbladder wall thickening similar "to CT  Ahmadi's sign is negative as patient is medicated  Acute cholecystitis is still in the differential and further management based on clinical criteria  - Care per primary team     5  Hypokalemia     - 6/10/23 potassium: 2 9   - 6/12/23 potassium: 4 3   - Replete potassium above 4   - Continue to monitor  6  Hypomagnesia  - 6/10/23 magnesium: 1 7   - 6/11/23 magnesium: 2 0   - Replete magnesium above 2   - Continue to monitor  7   AUBREE  - 6/10/23 creatinine: 1 33    - Creatinine has now normalized  AUBREE resolved  Subjective / Objective:         No acute events  Patient denies experiencing any chest pain overnight and currently denies chest pain  Plan for nuclear stress test today, patient is agreeable to plan  Awaiting repeat troponin  Patient currently denies chest pain, palpitations, shortness of breath, lightheadedness, dizziness, nausea, or vomiting  Vitals: Blood pressure 110/80, pulse 98, temperature (!) 97 4 °F (36 3 °C), temperature source Oral, resp  rate 19, height 5' 11\" (1 803 m), weight 84 4 kg (186 lb), SpO2 96 %  Vitals:    06/10/23 0842 06/12/23 1225   Weight: 84 4 kg (186 lb) 84 4 kg (186 lb)     Body mass index is 25 94 kg/m²    BP Readings from Last 3 Encounters:   06/12/23 110/80   11/26/21 126/70   10/30/16 138/70     Orthostatic Blood Pressures    Flowsheet Row Most Recent Value   Blood Pressure 110/80 filed at 06/12/2023 1225   Patient Position - Orthostatic VS Lying filed at 06/12/2023 0745        I/O       06/10 0701 06/11 0700 06/11 0701  06/12 0700 06/12 0701 06/13 0700    IV Piggyback       Total Intake(mL/kg)       Urine (mL/kg/hr)  425 (0 2)     Total Output  425     Net  -425                Invasive Devices     Peripheral Intravenous Line  Duration           Peripheral IV 06/10/23 Left;Lateral Antecubital 2 days    Peripheral IV 06/10/23 Right;Upper Arm 2 days                No intake or output data in the 24 hours ending 06/12/23 1331      Physical " Exam:   Physical Exam  Vitals reviewed  Constitutional:       General: He is not in acute distress  Cardiovascular:      Rate and Rhythm: Normal rate and regular rhythm  Pulses: Normal pulses  Heart sounds: Murmur heard  Pulmonary:      Effort: Pulmonary effort is normal  No respiratory distress  Breath sounds: Normal breath sounds  Abdominal:      General: Abdomen is flat  There is no distension  Palpations: Abdomen is soft  Musculoskeletal:      Right lower leg: No edema  Left lower leg: No edema  Skin:     General: Skin is warm and dry  Neurological:      Mental Status: He is alert and oriented to person, place, and time         Medications/ Allergies:     Current Facility-Administered Medications   Medication Dose Route Frequency Provider Last Rate   • aspirin  81 mg Oral Daily Lillie Deng MD     • atorvastatin  80 mg Oral QPM Lillie Deng MD     • cefTRIAXone  1,000 mg Intravenous Q24H MYLA Baker 1,000 mg (06/12/23 0542)   • diltiazem  30 mg Oral Q8H [facility] Mora Ko MD     • heparin (porcine)  3-20 Units/kg/hr (Order-Specific) Intravenous Titrated Haily Beavers MD 18 Units/kg/hr (06/12/23 0756)   • heparin (porcine)  2,000 Units Intravenous Q6H PRN Haily Beavers MD     • heparin (porcine)  4,000 Units Intravenous Q6H PRN Haily Beavers MD     • hydrOXYzine HCL  25 mg Oral Q6H PRN MYLA Baker     • LORazepam  0 5 mg Intravenous Q4H PRN Lillie Deng MD     • magnesium Oxide  400 mg Oral BID Lillie Deng MD     • metroNIDAZOLE  500 mg Oral Q8H [facility] MYLA Hernadez     • nitroglycerin  0 4 mg Sublingual Q5 Min PRN Lillie Deng MD     • ondansetron  4 mg Intravenous Q6H PRN Lillie Deng MD     • pantoprazole  40 mg Oral Early Morning Lillie Deng MD     • polyethylene glycol  17 g Oral Daily Lillie Deng MD     • potassium chloride  20 mEq Oral Daily Lillie Deng MD     • saccharomyces boulardii  250 mg Oral BID Lakisha Calero Sunil Lazar MD     • senna-docusate sodium  1 tablet Oral HS Lillie Deng MD     • sodium chloride  125 mL/hr Intravenous Continuous Lillie Deng  mL/hr (06/12/23 0756)     heparin (porcine), 2,000 Units, Q6H PRN  heparin (porcine), 4,000 Units, Q6H PRN  hydrOXYzine HCL, 25 mg, Q6H PRN  LORazepam, 0 5 mg, Q4H PRN  nitroglycerin, 0 4 mg, Q5 Min PRN  ondansetron, 4 mg, Q6H PRN      No Known Allergies    VTE Pharmacologic Prophylaxis: none  Labs:   Troponins:  Results from last 7 days   Lab Units 06/11/23  0419 06/10/23  0228 06/10/23  0437 06/10/23  0228   CK TOTAL U/L 535*  --   --  989*   HS TNI RAND ng/L 985*  --   --   --    HSTNI D2 ng/L  --   --  676*  --    HSTNI D4 ng/L  --  1,551*  --   --          CBC with diff:  Results from last 7 days   Lab Units 06/12/23  0556 06/11/23  0419 06/10/23  0228   HEMATOCRIT % 43 3 42 2 43 5   HEMOGLOBIN g/dL 14 6 14 8 15 4   MCH pg 31 2 31 6 31 3   MCHC g/dL 33 7 35 1 35 4   MCV fL 93 90 88   MPV fL 11 5 11 5 11 6   NRBC AUTO /100 WBCs  --   --  0   PLATELETS Thousands/uL 167 191 227   RBC Million/uL 4 68 4 68 4 92   RDW % 13 0 12 9 11 9   WBC Thousand/uL 9 58 14  05* 18 84*       CMP:  Results from last 7 days   Lab Units 06/12/23  0556 06/11/23  0419 06/10/23  0228   ANION GAP mmol/L 7 10 18*   ALBUMIN g/dL 3 9 4 3 5 1*   ALK PHOS U/L 39 44 42   ALT U/L 23 26 31   AST U/L 30 41* 45*   BUN mg/dL 14 13 25   CALCIUM mg/dL 8 5 8 9 9 8   CHLORIDE mmol/L 109* 104 95*   CO2 mmol/L 19* 20* 19*   CREATININE mg/dL 1 05 1 17 1 33*   EGFR ml/min/1 73sq m 92 80 69   POTASSIUM mmol/L 4 3 3 7 2 9*   SODIUM mmol/L 135 134* 132*   TOTAL BILIRUBIN mg/dL 1 14* 1 44* 0 72   TOTAL PROTEIN g/dL 6 4 7 0 8 1       Magnesium:  Results from last 7 days   Lab Units 06/11/23 0419 06/10/23  0228   MAGNESIUM mg/dL 2 0 1 7*     Coags:  Results from last 7 days   Lab Units 06/12/23  1119 06/11/23  1034 06/11/23  0419 06/10/23  2145 06/10/23  1409 06/10/23  0228   INR   --   --   --   --   -- "1 13   PTT seconds 94* 88* 88* 59* 35 28     TSH:    No components found for: \"TSH3\"  Lipid Profile:  Results from last 7 days   Lab Units 06/10/23  0228   CHOLESTEROL mg/dL 159   HDL mg/dL 62   LDL CALC mg/dL 84   TRIGLYCERIDES mg/dL 66     Hgb A1c:  Results from last 7 days   Lab Units 06/10/23  0228   HEMOGLOBIN A1C % 5 1     NT-proBNP: No results for input(s): \"NTBNP\" in the last 72 hours  Imaging & Testing   I have personally reviewed pertinent reports  XR abdomen 1 view kub    Result Date: 6/12/2023    Impression: Unremarkable abdomen  CTA dissection protocol chest/abdomen/pelvis    Result Date: 6/10/2023    Impression: Trace amount of complex abnormal fluid in the pelvis for a male patient could relate to infectious/inflammatory process or other etiology, correlate clinically  No evidence of intramural hematoma, dissection or aneurysm  Persistent diffuse gallbladder wall thickening as seen on ultrasound  US right upper quadrant    Result Date: 6/10/2023    Impression: Diffuse gallbladder wall thickening similar to CT  Ahmadi's sign is negative as patient is medicated  Acute cholecystitis is still in the differential and further management based on clinical criteria  XR chest 1 view portable    Result Date: 6/10/2023    Impression: No acute cardiopulmonary disease  CT abdomen pelvis with contrast    Result Date: 6/10/2023    Impression: 1  Diffuse gallbladder wall thickening and pericholecystic edema noted without calcified gallstones  Recommend right upper quadrant ultrasound for better evaluation  No significant intrahepatic or extrahepatic biliary ductal dilatation  2   Subtle diffuse wall thickening of the stomach, descending colon, sigmoid colon, and rectum could be related to under distention versus gastritis/colitis  Recommend clinical correlation  3   No evidence for bowel obstruction, appendicitis, obstructive uropathy, or free air   4   Small amount of nonspecific pathological " free fluid in the pelvis of uncertain etiology  EKG / Monitor: Personally reviewed  Telemetry reviewed: Patient currently in sinus rhythm, rate 85 bpm   Noted episodes of sinus tachycardia overnight  Cardiac testing:   Echo complete w/ contrast if indicated    Result Date: 6/10/2023  Narrative: •  Left Ventricle: Left ventricular cavity size is normal  Wall thickness is normal  Systolic function is normal   Estimated ejection fraction is 50-55%  Although no diagnostic regional wall motion abnormality was identified, this possibility cannot be completely excluded on the basis of this study  Diastolic function is normal  •  Right Ventricle: Right ventricular cavity size is mildly dilated  Systolic function is normal  •  Right Atrium: The atrium is mildly dilated  •  Mitral Valve: There is mild regurgitation  •  Tricuspid Valve: There is trace regurgitation  •  Pulmonic Valve: There is trace regurgitation         Willie Crowder PA-C

## 2023-06-12 NOTE — ASSESSMENT & PLAN NOTE
Resolved,  · CT abdomen:   · Diffuse gallbladder wall thickening and pericholecystic edema noted without calcified gallstones  Recommend right upper quadrant ultrasound for better evaluation  No significant intrahepatic or extrahepatic biliary ductal dilatation  · 2  Subtle diffuse wall thickening of the stomach, descending colon, sigmoid colon, and rectum could be related to under distention versus gastritis/colitis  · RUQ ultrasound-pending  · Consider GI/surgical consult if positive findings  · Empiric Rocephin  · Toradol for pain  · Serial abdominal exams  · CTA dissection protocol:  · Trace amount of complex abnormal fluid in the pelvis for a male patient could relate to infectious/inflammatory process or other etiology, correlate clinically  No evidence of intramural hematoma, dissection or aneurysm  · Persistent diffuse gallbladder wall thickening as seen on ultrasound

## 2023-06-12 NOTE — ASSESSMENT & PLAN NOTE
Fluctuant abdominal pain generalized, also history of underlying anxiety    · CT abdomen:Diffuse gallbladder wall thickening and pericholecystic edema noted without calcified gallstones  Recommend right upper quadrant ultrasound for better evaluation  No significant intrahepatic or extrahepatic biliary ductal dilatation  · RUQ US-Diffuse gallbladder wall thickening similar to CT  Ahmadi's sign is negative as patient is medicated  Acute cholecystitis is still in the differential and further management based on clinical criteria  · Denies any abdominal pain  · Tolerating diet    Abdominal exam is benign  · Hepatitis panel-pending  · Serial abdominal exams

## 2023-06-12 NOTE — PROGRESS NOTES
The metronidazole has / have been converted to Oral per [facility] IV-to-PO Auto-Conversion Protocol for Adults as approved by the Pharmacy and Therapeutics Committee  The patient met all eligible criteria:  3 Age = 23years old   2) Received at least one dose of the IV form   3) Receiving at least one other scheduled oral/enteral medication   4) Tolerating an oral/enteral diet   and did not have any exclusions:   1) Critical care patient   2) Active GI bleed (IF assessing H2RAs or PPIs)   3) Continuous tube feeding (IF assessing cipro, doxycycline, levofloxacin, minocycline, rifampin, or voriconazole)   4) Receiving PO vancomycin (IF assessing metronidazole)   5) Persistent nausea and/or vomiting   6) Ileus or gastrointestinal obstruction   7) [place]/nasogastric tube set for continuous suction   8) Specific order not to automatically convert to PO (in the order's comments or if discussed in the most recent Infectious Disease or primary team's progress notes)

## 2023-06-12 NOTE — PROGRESS NOTES
[facility] 43  Progress Note  Name: Vanita Turk  MRN: [id number]  Unit/Bed#: [address] [address] I Date of Admission: 6/10/2023   Date of Service: 6/12/2023 I Hospital Day: 2    Assessment/Plan   Chest pain  Assessment & Plan  Currently asymptomatic      Low risk:   /   DOLORES Risk Score    Flowsheet Row Most Recent Value   Age >= 72 0 Filed at: 06/10/2023 7210   Known CAD (stenosis >= 50%) 0 Filed at: 06/10/2023 1210   Recent (<=24 hrs) Service Angina 1 Filed at: 06/10/2023 0921   ST Deviation >= 0 5 mm 0 Filed at: 06/10/2023 3787   3+ CAD Risk Factors (FHx, HTN, HLP, DM, Smoker) 0 Filed at: 06/10/2023 6596   Aspirin Use Past 7 Days 0 Filed at: 06/10/2023 9763   Elevated Cardiac Markers 1 Filed at: 06/10/2023 8583   DOLORES Risk Score (Calculated) 2 Filed at: 06/10/2023 5021        • EKG: Sinus bradycardia  • CXR-No acute cardiopulmonary disease  Serial Troponin I  trending down  Likely type 2 MI secondary Meth use vs ACS  Echo: Pending  Telemetry in place  • CBC, CMP- trending   • BNP-pending  • [id number]%,   • Lipid Panel-pending  • Telemetry in place  • Meds: As needed morphine, nitrates, supplemental oxygen  • Cardiology recs:  o Continue aspirin, IV heparin, atorvastatin, p o  Cardizem  o Maintain adequate potassium and magnesium balance  o DC IV heparin in a m  if troponin is still receding  o Abnormal nuclear stress test   o Plan for cardiac cath                     Sepsis [facility])  Assessment & Plan  Evidenced by tachypnea, leukocytosis and possible colitis    · CT abdomen:  · Diffuse gallbladder wall thickening and pericholecystic edema noted without calcified gallstones  Recommend RUQ US for better evaluation  No significant intrahepatic or extrahepatic biliary ductal dilatation  2   Subtle diffuse wall thickening of the stomach, descending  colon, sigmoid colon, and rectum could be related to under  distention versus gastritis/colitis  Recommend correlation     3   No evidence for bowel obstruction, appendicitis, obstructive  uropathy, or free air  4   Small amount of nonspecific pathological free fluid in the  pelvis of uncertain etiology  · Blood cultures- NGTD 24 H  · Pro-Nam/lactic acid- WNL  · Will initiate empiric antibiotic Rocephin, will DC  · IVF  · Monitor SSX of infection          Methamphetamine use (HCC)  Assessment & Plan  PTA with symptoms of chest pain    · UDS-congruent with methamphetamine and THC  · Counseled on cessation  · Contraindicated beta-blockers for symptomatic management    Generalized abdominal pain-resolved as of 6/11/2023  Assessment & Plan  Resolved,  · CT abdomen:   · Diffuse gallbladder wall thickening and pericholecystic edema noted without calcified gallstones  Recommend right upper quadrant ultrasound for better evaluation  No significant intrahepatic or extrahepatic biliary ductal dilatation  · 2  Subtle diffuse wall thickening of the stomach, descending colon, sigmoid colon, and rectum could be related to under distention versus gastritis/colitis  · RUQ ultrasound-pending  · Consider GI/surgical consult if positive findings  · Empiric Rocephin  · Toradol for pain  · Serial abdominal exams  · CTA dissection protocol:  · Trace amount of complex abnormal fluid in the pelvis for a male patient could relate to infectious/inflammatory process or other etiology, correlate clinically  No evidence of intramural hematoma, dissection or aneurysm  · Persistent diffuse gallbladder wall thickening as seen on ultrasound      Other specified anxiety disorders  Assessment & Plan  Possibly undiagnosed anxiety versus drug abuse withdrawal versus IAD    · As needed Ativan and Atarax  · Psych consulted  · CBT reinforcement    Elevated glucose  Assessment & Plan  · POC-156  · [id number]  · Will initiate SSI if needed    Electrolyte abnormality  Assessment & Plan  Resolved  · K+-2 9, replenished in ED will continue  · NA-132/CL-95, continue IVF  · Mag-1 7, replenish and monitor  · Trend    Elevated CK  Assessment & Plan  · CK-989 >535  · Likely secondary to primary admission diagnosis  · We will monitor  · Continue IVF    Thickening of wall of gallbladder with pericholecystic fluid  Assessment & Plan  Fluctuant abdominal pain generalized, also history of underlying anxiety    · CT abdomen:Diffuse gallbladder wall thickening and pericholecystic edema noted without calcified gallstones  Recommend right upper quadrant ultrasound for better evaluation  No significant intrahepatic or extrahepatic biliary ductal dilatation  · RUQ US-Diffuse gallbladder wall thickening similar to CT  Ahmadi's sign is negative as patient is medicated  Acute cholecystitis is still in the differential and further management based on clinical criteria  · Hepatitis panel-pending  · Serial abdominal exams    Metabolic acidosis, increased anion gap (IAG)-resolved as of 6/11/2023  Assessment & Plan  Resolved  · AG-18  · LA- elevated likely due to underlying cell death from cardiac demand, repeat pending  · Beta hydroxybutyrate- WNL  · Ethanol- WNL  · Continue IVF    Elevated serum creatinine-resolved as of 6/11/2023  Assessment & Plan  · CR-1 33 > 1 17  · Avoid nephrotoxic's  · We will monitor           VTE Pharmacologic Prophylaxis:   Pharmacologic: Heparin Drip  Mechanical VTE Prophylaxis in Place: No    Patient Centered Rounds:  I performed bedside rounds with nursing staff today  Discussions with Specialists or Other Care Team Provider: Cardiology    Education and Discussions with Family / Patient: Patient    Time Spent for Care:  45 minutes This time was spent on one or more of the following: performing physical exam; counseling and coordination of care; obtaining or reviewing history; documenting in the medical record; reviewing/ordering tests, medications or procedures; communicating with other healthcare professionals and discussing with patient's family/caregivers      Current Length of Stay: 2 day(s)    Current Patient Status: Inpatient   Certification Statement: Clinical course and specialist recommendations    Discharge Plan: Anticipate discharge in 24-48 hrs to discharge location to be determined pending rehab evaluations  Code Status: Level 1 - Full Code      Subjective:   Patient seen and examined at bedside with wife present reported that he had no abdominal pain, had bowel movement and no problems with urination  After second round yesterday evening with MD reported abdominal pain and therefore may have underlying anxiety  Patient reported that he was told he has anxiety in the past and reported associated symptoms of body vibrations, sweating and feels like he is going to jump out of his skin  Patient reported associated shortness of breath with walking to the bathroom and back to the bed but after deep breathing symptoms resolved  Objective:     Vitals:   Temp (24hrs), Av 8 °F (36 6 °C), Min:97 4 °F (36 3 °C), Max:98 6 °F (37 °C)    Temp:  [97 4 °F (36 3 °C)-98 6 °F (37 °C)] 97 7 °F (36 5 °C)  HR:  [62-98] 97  Resp:  [16-20] 20  BP: (110-133)/(69-98) 133/98  SpO2:  [96 %-100 %] 99 %  Body mass index is 25 94 kg/m²  Input and Output Summary (last 24 hours):     No intake or output data in the 24 hours ending 23 1710    Physical Exam:     Physical Exam  Vitals reviewed  Constitutional:       General: He is not in acute distress  Appearance: Normal appearance  HENT:      Head: Atraumatic  Nose: No congestion  Eyes:      General: No scleral icterus  Pupils: Pupils are equal, round, and reactive to light  Cardiovascular:      Rate and Rhythm: Normal rate  Heart sounds: No murmur heard  Pulmonary:      Effort: No respiratory distress  Breath sounds: No wheezing, rhonchi or rales  Abdominal:      Palpations: Abdomen is soft  Tenderness: There is no abdominal tenderness   There is no right CVA tenderness, left CVA tenderness, guarding or rebound  Musculoskeletal:         General: No swelling or deformity  Normal range of motion  Cervical back: No tenderness  Right lower leg: No edema  Left lower leg: No edema  Feet:      Right foot:      Skin integrity: No callus  Skin:     General: Skin is warm  Capillary Refill: Capillary refill takes less than 2 seconds  Findings: No lesion or rash  Neurological:      Mental Status: He is oriented to person, place, and time  Cranial Nerves: No cranial nerve deficit  Coordination: Coordination normal    Psychiatric:         Mood and Affect: Mood normal          Speech: Speech is rapid and pressured  Behavior: Behavior normal          Thought Content: Thought content normal          Additional Data:     Labs:    Results from last 7 days   Lab Units 06/12/23  0556 06/11/23  0419 06/10/23  0228   EOS PCT %  --   --  0   HEMATOCRIT % 43 3   < > 43 5   HEMOGLOBIN g/dL 14 6   < > 15 4   LYMPHS PCT %  --   --  7*   MONOS PCT %  --   --  5   NEUTROS PCT %  --   --  88*   PLATELETS Thousands/uL 167   < > 227   WBC Thousand/uL 9 58   < > 18 84*    < > = values in this interval not displayed  Results from last 7 days   Lab Units 06/12/23  0556   ALK PHOS U/L 39   ALT U/L 23   AST U/L 30   BUN mg/dL 14   CALCIUM mg/dL 8 5   CHLORIDE mmol/L 109*   CO2 mmol/L 19*   CREATININE mg/dL 1 05   POTASSIUM mmol/L 4 3     Results from last 7 days   Lab Units 06/10/23  0228   INR  1 13       * I Have Reviewed All Lab Data Listed Above  * Additional Pertinent Lab Tests Reviewed: No correct      Imaging:  Imaging Reports Reviewed Today Include: CT, CTA    Recent Cultures (last 7 days):     Results from last 7 days   Lab Units 06/10/23  1414   BLOOD CULTURE  No Growth at 24 hrs         Last 24 Hours Medication List:   Current Facility-Administered Medications   Medication Dose Route Frequency Provider Last Rate   • aspirin  81 mg Oral Daily Jose C Holm MD     • "atorvastatin  80 mg Oral QPM Radha Andrade MD     • cefTRIAXone  1,000 mg Intravenous Q24H MYLA Baker 1,000 mg (06/12/23 0542)   • diltiazem  30 mg Oral Q8H [facility] HOME Darshana Delvalle MD     • heparin (porcine)  3-20 Units/kg/hr (Order-Specific) Intravenous Titrated Morgan Malcolm MD 16 Units/kg/hr (06/12/23 1522)   • heparin (porcine)  2,000 Units Intravenous Q6H PRN Morgan Malcolm MD     • heparin (porcine)  4,000 Units Intravenous Q6H PRN Morgan Malcolm MD     • hydrOXYzine HCL  25 mg Oral Q6H PRN MYLA Baker     • LORazepam  0 5 mg Intravenous Q4H PRN Radha Andrade MD     • magnesium Oxide  400 mg Oral BID Radha Andrade MD     • metroNIDAZOLE  500 mg Oral Q8H [facility] MYLA Whalen     • nitroglycerin  0 4 mg Sublingual Q5 Min PRN Radha Andrade MD     • ondansetron  4 mg Intravenous Q6H PRN Radha Andrade MD     • pantoprazole  40 mg Oral Early Morning Radha Andrade MD     • polyethylene glycol  17 g Oral Daily Radha Andrade MD     • potassium chloride  20 mEq Oral Daily Radha Andrade MD     • saccharomyces boulardii  250 mg Oral BID Radha Andrade MD     • senna-docusate sodium  1 tablet Oral HS Radha Andrade MD     • sodium chloride  125 mL/hr Intravenous Continuous Radha Andrade  mL/hr (06/12/23 1618)          Today, Patient Was Seen By: Radha Andrade MD    ** Please Note: \"This note has been constructed using a voice recognition system  Therefore there may be syntax, spelling, and/or grammatical errors  Please call if you have any questions   \"**    "

## 2023-06-12 NOTE — ASSESSMENT & PLAN NOTE
DOLORES Risk Score    Flowsheet Row Most Recent Value   Age >= 72 0 Filed at: 06/10/2023 1647   Known CAD (stenosis >= 50%) 0 Filed at: 06/10/2023 9076   Recent (<=24 hrs) Service Angina 1 Filed at: 06/10/2023 0921   ST Deviation >= 0 5 mm 0 Filed at: 06/10/2023 2086   3+ CAD Risk Factors (FHx, HTN, HLP, DM, Smoker) 0 Filed at: 06/10/2023 7288   Aspirin Use Past 7 Days 0 Filed at: 06/10/2023 7233   Elevated Cardiac Markers 1 Filed at: 06/10/2023 6420   DOLORES Risk Score (Calculated) 2 Filed at: 06/10/2023 4159        • EKG: Sinus bradycardia  • CXR-No acute cardiopulmonary disease  Noted to have elevated troponin, now trending down   Likely type 2 MI secondary Meth use vs ACS  Echo: EF 35-45%, normal diastolic function  Initially started on IV heparin now discontinued  Cardiology following, input appreciated, denies any chest pain at present  • Status post stress test-abnormal  • S/p cardiac cath today  o Continue medical management  o Follow-up labs, electrolytes  o Monitor for symptoms

## 2023-06-12 NOTE — PLAN OF CARE
Problem: Potential for Falls  Goal: Patient will remain free of falls  Description: INTERVENTIONS:  - Educate patient/family on patient safety including physical limitations  - Instruct patient to call for assistance with activity   - Consult OT/PT to assist with strengthening/mobility   - Keep Call bell within reach  - Keep bed low and locked with side rails adjusted as appropriate  - Keep care items and personal belongings within reach  - Initiate and maintain comfort rounds  - Make Fall Risk Sign visible to staff  Problem: CARDIOVASCULAR - ADULT  Goal: Maintains optimal cardiac output and hemodynamic stability  Description: INTERVENTIONS:  - Monitor I/O, vital signs and rhythm  - Monitor for S/S and trends of decreased cardiac output  - Administer and titrate ordered vasoactive medications to optimize hemodynamic stability  - Assess quality of pulses, skin color and temperature  - Assess for signs of decreased coronary artery perfusion  - Instruct patient to report change in severity of symptoms  Outcome: Progressing  Goal: Absence of cardiac dysrhythmias or at baseline rhythm  Description: INTERVENTIONS:  - Continuous cardiac monitoring, vital signs, obtain 12 lead EKG if ordered  - Administer antiarrhythmic and heart rate control medications as ordered  - Monitor electrolytes and administer replacement therapy as ordered  Outcome: Progressing     Problem: PAIN - ADULT  Goal: Verbalizes/displays adequate comfort level or baseline comfort level  Description: Interventions:  - Encourage patient to monitor pain and request assistance  - Assess pain using appropriate pain scale  - Administer analgesics based on type and severity of pain and evaluate response  - Implement non-pharmacological measures as appropriate and evaluate response  - Consider cultural and social influences on pain and pain management  - Notify physician/advanced practitioner if interventions unsuccessful or patient reports new pain  Outcome: Progressing     Problem: INFECTION - ADULT  Goal: Absence or prevention of progression during hospitalization  Description: INTERVENTIONS:  - Assess and monitor for signs and symptoms of infection  - Monitor lab/diagnostic results  - Monitor all insertion sites, i e  indwelling lines, tubes, and drains  - Monitor endotracheal if appropriate and nasal secretions for changes in amount and color  - [place] appropriate cooling/warming therapies per order  - Administer medications as ordered  - Instruct and encourage patient and family to use good hand hygiene technique  - Identify and instruct in appropriate isolation precautions for identified infection/condition  Outcome: Progressing     Problem: SAFETY ADULT  Goal: Patient will remain free of falls  Description: INTERVENTIONS:  - Educate patient/family on patient safety including physical limitations  - Instruct patient to call for assistance with activity   - Consult OT/PT to assist with strengthening/mobility   - Keep Call bell within reach  - Keep bed low and locked with side rails adjusted as appropriate  - Keep care items and personal belongings within reach  - Initiate and maintain comfort rounds  - Make Fall Risk Sign visible to staff  Problem: INFECTION - ADULT  Goal: Absence or prevention of progression during hospitalization  Description: INTERVENTIONS:  - Assess and monitor for signs and symptoms of infection  - Monitor lab/diagnostic results  - Monitor all insertion sites, i e  indwelling lines, tubes, and drains  - Monitor endotracheal if appropriate and nasal secretions for changes in amount and color  - [place] appropriate cooling/warming therapies per order  - Administer medications as ordered  - Instruct and encourage patient and family to use good hand hygiene technique  - Identify and instruct in appropriate isolation precautions for identified infection/condition  Outcome: Progressing     Problem: SAFETY ADULT  Goal: Patient will remain free of falls  Description: INTERVENTIONS:  - Educate patient/family on patient safety including physical limitations  - Instruct patient to call for assistance with activity   - Consult OT/PT to assist with strengthening/mobility   - Keep Call bell within reach  - Keep bed low and locked with side rails adjusted as appropriate  - Keep care items and personal belongings within reach  - Initiate and maintain comfort rounds  - Make Fall Risk Sign visible to staff  - Apply yellow socks and bracelet for high fall risk patients  - Consider moving patient to room near nurses station  Outcome: Progressing  Goal: Maintain or return to baseline ADL function  Description: INTERVENTIONS:  -  Assess patient's ability to carry out ADLs; assess patient's baseline for ADL function and identify physical deficits which impact ability to perform ADLs (bathing, care of mouth/teeth, toileting, grooming, dressing, etc )  - Assess/evaluate cause of self-care deficits   - Assess range of motion  - Assess patient's mobility; develop plan if impaired  - Assess patient's need for assistive devices and provide as appropriate  - Encourage maximum independence but intervene and supervise when necessary  - Involve family in performance of ADLs  - Assess for home care needs following discharge   - Consider OT consult to assist with ADL evaluation and planning for discharge  - Provide patient education as appropriate  Outcome: Progressing  Goal: Maintains/Returns to pre admission functional level  Description: INTERVENTIONS:  - Perform BMAT or MOVE assessment daily    - Set and communicate daily mobility goal to care team and patient/family/caregiver     - Collaborate with rehabilitation services on mobility goals if consulted  Problem: DISCHARGE PLANNING  Goal: Discharge to home or other facility with appropriate resources  Description: INTERVENTIONS:  - Identify barriers to discharge w/patient and caregiver  - Arrange for needed discharge resources and transportation as appropriate  - Identify discharge learning needs (meds, wound care, etc )  - Arrange for interpretive services to assist at discharge as needed  - Refer to Case Management Department for coordinating discharge planning if the patient needs post-hospital services based on physician/advanced practitioner order or complex needs related to functional status, cognitive ability, or social support system  Outcome: Progressing     - Out of bed for toileting  - Record patient progress and toleration of activity level   Outcome: Progressing     - Apply yellow socks and bracelet for high fall risk patients  - Consider moving patient to room near nurses station  Outcome: Progressing  Goal: Maintain or return to baseline ADL function  Description: INTERVENTIONS:  -  Assess patient's ability to carry out ADLs; assess patient's baseline for ADL function and identify physical deficits which impact ability to perform ADLs (bathing, care of mouth/teeth, toileting, grooming, dressing, etc )  - Assess/evaluate cause of self-care deficits   - Assess range of motion  - Assess patient's mobility; develop plan if impaired  - Assess patient's need for assistive devices and provide as appropriate  - Encourage maximum independence but intervene and supervise when necessary  - Involve family in performance of ADLs  - Assess for home care needs following discharge   - Consider OT consult to assist with ADL evaluation and planning for discharge  - Provide patient education as appropriate  Outcome: Progressing  Goal: Maintains/Returns to pre admission functional level  Description: INTERVENTIONS:  - Perform BMAT or MOVE assessment daily    - Set and communicate daily mobility goal to care team and patient/family/caregiver     - Collaborate with rehabilitation services on mobility goals if consulted  - Out of bed for toileting  - Record patient progress and toleration of activity level   Outcome: Progressing     - Apply yellow socks and bracelet for high fall risk patients  - Consider moving patient to room near nurses station  Outcome: Progressing

## 2023-06-12 NOTE — ASSESSMENT & PLAN NOTE
Evidenced by tachypnea, leukocytosis and possible colitis    · CT abdomen:  · Diffuse gallbladder wall thickening and pericholecystic edema noted without calcified gallstones  Recommend RUQ US for better evaluation  No significant intrahepatic or extrahepatic biliary ductal dilatation  2   Subtle diffuse wall thickening of the stomach, descending  colon, sigmoid colon, and rectum could be related to under  distention versus gastritis/colitis  Recommend correlation  3   No evidence for bowel obstruction, appendicitis, obstructive  uropathy, or free air  4   Small amount of nonspecific pathological free fluid in the  pelvis of uncertain etiology    · Blood cultures- NGTD 24 H  · Pro-Nam/lactic acid- WNL  · Denies any chest pain or abdominal pain  · Tolerating diet  · No obvious signs or symptoms of infection  · Cultures negative, I will discontinue antibiotics

## 2023-06-12 NOTE — ASSESSMENT & PLAN NOTE
Possibly undiagnosed anxiety versus drug abuse withdrawal versus IAD    · Psych consulted-input appreciated  · Continue Zyprexa

## 2023-06-13 LAB
ALBUMIN SERPL BCP-MCNC: 3.5 G/DL (ref 3.5–5)
ALP SERPL-CCNC: 34 U/L (ref 34–104)
ALT SERPL W P-5'-P-CCNC: 35 U/L (ref 7–52)
ANION GAP SERPL CALCULATED.3IONS-SCNC: 5 MMOL/L (ref 4–13)
APTT PPP: 84 SECONDS (ref 23–37)
AST SERPL W P-5'-P-CCNC: 37 U/L (ref 13–39)
BILIRUB SERPL-MCNC: 0.82 MG/DL (ref 0.2–1)
BUN SERPL-MCNC: 14 MG/DL (ref 5–25)
CALCIUM SERPL-MCNC: 8.4 MG/DL (ref 8.4–10.2)
CHLORIDE SERPL-SCNC: 109 MMOL/L (ref 96–108)
CO2 SERPL-SCNC: 23 MMOL/L (ref 21–32)
CREAT SERPL-MCNC: 1.08 MG/DL (ref 0.6–1.3)
ERYTHROCYTE [DISTWIDTH] IN BLOOD BY AUTOMATED COUNT: 12.9 % (ref 11.6–15.1)
GFR SERPL CREATININE-BSD FRML MDRD: 89 ML/MIN/1.73SQ M
GLUCOSE SERPL-MCNC: 89 MG/DL (ref 65–140)
HCT VFR BLD AUTO: 37.3 % (ref 36.5–49.3)
HGB BLD-MCNC: 12.7 G/DL (ref 12–17)
MCH RBC QN AUTO: 31.2 PG (ref 26.8–34.3)
MCHC RBC AUTO-ENTMCNC: 34 G/DL (ref 31.4–37.4)
MCV RBC AUTO: 92 FL (ref 82–98)
PLATELET # BLD AUTO: 154 THOUSANDS/UL (ref 149–390)
PMV BLD AUTO: 11 FL (ref 8.9–12.7)
POTASSIUM SERPL-SCNC: 4.1 MMOL/L (ref 3.5–5.3)
PROT SERPL-MCNC: 5.7 G/DL (ref 6.4–8.4)
RBC # BLD AUTO: 4.07 MILLION/UL (ref 3.88–5.62)
SODIUM SERPL-SCNC: 137 MMOL/L (ref 135–147)
WBC # BLD AUTO: 6.23 THOUSAND/UL (ref 4.31–10.16)

## 2023-06-13 PROCEDURE — C1769 GUIDE WIRE: HCPCS | Performed by: INTERNAL MEDICINE

## 2023-06-13 PROCEDURE — 4A023N7 MEASUREMENT OF CARDIAC SAMPLING AND PRESSURE, LEFT HEART, PERCUTANEOUS APPROACH: ICD-10-PCS | Performed by: INTERNAL MEDICINE

## 2023-06-13 PROCEDURE — NC001 PR NO CHARGE: Performed by: INTERNAL MEDICINE

## 2023-06-13 PROCEDURE — C1894 INTRO/SHEATH, NON-LASER: HCPCS | Performed by: INTERNAL MEDICINE

## 2023-06-13 PROCEDURE — 80053 COMPREHEN METABOLIC PANEL: CPT | Performed by: STUDENT IN AN ORGANIZED HEALTH CARE EDUCATION/TRAINING PROGRAM

## 2023-06-13 PROCEDURE — B2111ZZ FLUOROSCOPY OF MULTIPLE CORONARY ARTERIES USING LOW OSMOLAR CONTRAST: ICD-10-PCS | Performed by: INTERNAL MEDICINE

## 2023-06-13 PROCEDURE — 99153 MOD SED SAME PHYS/QHP EA: CPT | Performed by: INTERNAL MEDICINE

## 2023-06-13 PROCEDURE — 85730 THROMBOPLASTIN TIME PARTIAL: CPT | Performed by: STUDENT IN AN ORGANIZED HEALTH CARE EDUCATION/TRAINING PROGRAM

## 2023-06-13 PROCEDURE — 93458 L HRT ARTERY/VENTRICLE ANGIO: CPT | Performed by: INTERNAL MEDICINE

## 2023-06-13 PROCEDURE — G0425 INPT/ED TELECONSULT30: HCPCS | Performed by: PSYCHIATRY & NEUROLOGY

## 2023-06-13 PROCEDURE — 99232 SBSQ HOSP IP/OBS MODERATE 35: CPT | Performed by: INTERNAL MEDICINE

## 2023-06-13 PROCEDURE — B2151ZZ FLUOROSCOPY OF LEFT HEART USING LOW OSMOLAR CONTRAST: ICD-10-PCS | Performed by: INTERNAL MEDICINE

## 2023-06-13 PROCEDURE — 99152 MOD SED SAME PHYS/QHP 5/>YRS: CPT | Performed by: INTERNAL MEDICINE

## 2023-06-13 PROCEDURE — 85027 COMPLETE CBC AUTOMATED: CPT | Performed by: STUDENT IN AN ORGANIZED HEALTH CARE EDUCATION/TRAINING PROGRAM

## 2023-06-13 RX ORDER — HEPARIN SODIUM 1000 [USP'U]/ML
INJECTION, SOLUTION INTRAVENOUS; SUBCUTANEOUS CODE/TRAUMA/SEDATION MEDICATION
Status: DISCONTINUED | OUTPATIENT
Start: 2023-06-13 | End: 2023-06-13 | Stop reason: HOSPADM

## 2023-06-13 RX ORDER — MIDAZOLAM HYDROCHLORIDE 2 MG/2ML
INJECTION, SOLUTION INTRAMUSCULAR; INTRAVENOUS CODE/TRAUMA/SEDATION MEDICATION
Status: DISCONTINUED | OUTPATIENT
Start: 2023-06-13 | End: 2023-06-13 | Stop reason: HOSPADM

## 2023-06-13 RX ORDER — NITROGLYCERIN 20 MG/100ML
INJECTION INTRAVENOUS CODE/TRAUMA/SEDATION MEDICATION
Status: DISCONTINUED | OUTPATIENT
Start: 2023-06-13 | End: 2023-06-13 | Stop reason: HOSPADM

## 2023-06-13 RX ORDER — SODIUM CHLORIDE 9 MG/ML
75 INJECTION, SOLUTION INTRAVENOUS CONTINUOUS
Status: DISCONTINUED | OUTPATIENT
Start: 2023-06-13 | End: 2023-06-13

## 2023-06-13 RX ORDER — LIDOCAINE HYDROCHLORIDE 10 MG/ML
INJECTION, SOLUTION EPIDURAL; INFILTRATION; INTRACAUDAL; PERINEURAL CODE/TRAUMA/SEDATION MEDICATION
Status: DISCONTINUED | OUTPATIENT
Start: 2023-06-13 | End: 2023-06-13 | Stop reason: HOSPADM

## 2023-06-13 RX ORDER — FUROSEMIDE 10 MG/ML
20 INJECTION INTRAMUSCULAR; INTRAVENOUS ONCE
Status: COMPLETED | OUTPATIENT
Start: 2023-06-13 | End: 2023-06-13

## 2023-06-13 RX ORDER — VERAPAMIL HCL 2.5 MG/ML
AMPUL (ML) INTRAVENOUS CODE/TRAUMA/SEDATION MEDICATION
Status: DISCONTINUED | OUTPATIENT
Start: 2023-06-13 | End: 2023-06-13 | Stop reason: HOSPADM

## 2023-06-13 RX ORDER — ENOXAPARIN SODIUM 100 MG/ML
40 INJECTION SUBCUTANEOUS
Status: DISCONTINUED | OUTPATIENT
Start: 2023-06-14 | End: 2023-06-14 | Stop reason: HOSPADM

## 2023-06-13 RX ORDER — OLANZAPINE 2.5 MG/1
5 TABLET ORAL
Status: DISCONTINUED | OUTPATIENT
Start: 2023-06-13 | End: 2023-06-14 | Stop reason: HOSPADM

## 2023-06-13 RX ORDER — FENTANYL CITRATE 50 UG/ML
INJECTION, SOLUTION INTRAMUSCULAR; INTRAVENOUS CODE/TRAUMA/SEDATION MEDICATION
Status: DISCONTINUED | OUTPATIENT
Start: 2023-06-13 | End: 2023-06-13 | Stop reason: HOSPADM

## 2023-06-13 RX ADMIN — ATORVASTATIN CALCIUM 80 MG: 80 TABLET, FILM COATED ORAL at 18:10

## 2023-06-13 RX ADMIN — DILTIAZEM HYDROCHLORIDE 30 MG: 30 TABLET, FILM COATED ORAL at 05:00

## 2023-06-13 RX ADMIN — MAGNESIUM OXIDE TAB 400 MG (241.3 MG ELEMENTAL MG) 400 MG: 400 (241.3 MG) TAB at 09:41

## 2023-06-13 RX ADMIN — SENNOSIDES AND DOCUSATE SODIUM 1 TABLET: 50; 8.6 TABLET ORAL at 21:05

## 2023-06-13 RX ADMIN — POTASSIUM CHLORIDE 20 MEQ: 1500 TABLET, EXTENDED RELEASE ORAL at 09:41

## 2023-06-13 RX ADMIN — CEFTRIAXONE 1000 MG: 1 INJECTION, SOLUTION INTRAVENOUS at 05:56

## 2023-06-13 RX ADMIN — PANTOPRAZOLE SODIUM 40 MG: 40 TABLET, DELAYED RELEASE ORAL at 05:00

## 2023-06-13 RX ADMIN — SODIUM CHLORIDE 75 ML/HR: 0.9 INJECTION, SOLUTION INTRAVENOUS at 09:38

## 2023-06-13 RX ADMIN — POLYETHYLENE GLYCOL 3350 17 G: 17 POWDER, FOR SOLUTION ORAL at 09:42

## 2023-06-13 RX ADMIN — ASPIRIN 81 MG CHEWABLE TABLET 81 MG: 81 TABLET CHEWABLE at 09:41

## 2023-06-13 RX ADMIN — METRONIDAZOLE 500 MG: 500 TABLET ORAL at 12:24

## 2023-06-13 RX ADMIN — Medication 250 MG: at 18:10

## 2023-06-13 RX ADMIN — DILTIAZEM HYDROCHLORIDE 30 MG: 30 TABLET, FILM COATED ORAL at 13:28

## 2023-06-13 RX ADMIN — Medication 250 MG: at 09:41

## 2023-06-13 RX ADMIN — SODIUM CHLORIDE 125 ML/HR: 0.9 INJECTION, SOLUTION INTRAVENOUS at 00:13

## 2023-06-13 RX ADMIN — FUROSEMIDE 20 MG: 10 INJECTION, SOLUTION INTRAVENOUS at 13:28

## 2023-06-13 RX ADMIN — METRONIDAZOLE 500 MG: 500 TABLET ORAL at 04:30

## 2023-06-13 RX ADMIN — OLANZAPINE 5 MG: 2.5 TABLET, FILM COATED ORAL at 21:05

## 2023-06-13 RX ADMIN — MAGNESIUM OXIDE TAB 400 MG (241.3 MG ELEMENTAL MG) 400 MG: 400 (241.3 MG) TAB at 18:11

## 2023-06-13 RX ADMIN — SODIUM CHLORIDE 75 ML/HR: 0.9 INJECTION, SOLUTION INTRAVENOUS at 12:26

## 2023-06-13 NOTE — PLAN OF CARE
Problem: Potential for Falls  Goal: Patient will remain free of falls  Description: INTERVENTIONS:  - Educate patient/family on patient safety including physical limitations  - Instruct patient to call for assistance with activity   - Consult OT/PT to assist with strengthening/mobility   - Keep Call bell within reach  - Keep bed low and locked with side rails adjusted as appropriate  - Keep care items and personal belongings within reach  - Initiate and maintain comfort rounds  - Make Fall Risk Sign visible to staff  - Offer Toileting every  2   Problem: CARDIOVASCULAR - ADULT  Goal: Maintains optimal cardiac output and hemodynamic stability  Description: INTERVENTIONS:  - Monitor I/O, vital signs and rhythm  - Monitor for S/S and trends of decreased cardiac output  - Administer and titrate ordered vasoactive medications to optimize hemodynamic stability  - Assess quality of pulses, skin color and temperature  - Assess for signs of decreased coronary artery perfusion  - Instruct patient to report change in severity of symptoms  Outcome: Progressing  Goal: Absence of cardiac dysrhythmias or at baseline rhythm  Description: INTERVENTIONS:  - Continuous cardiac monitoring, vital signs, obtain 12 lead EKG if ordered  - Administer antiarrhythmic and heart rate control medications as ordered  - Monitor electrolytes and administer replacement therapy as ordered  Outcome: Progressing     Problem: PAIN - ADULT  Goal: Verbalizes/displays adequate comfort level or baseline comfort level  Description: Interventions:  - Encourage patient to monitor pain and request assistance  - Assess pain using appropriate pain scale  - Administer analgesics based on type and severity of pain and evaluate response  - Implement non-pharmacological measures as appropriate and evaluate response  - Consider cultural and social influences on pain and pain management  - Notify physician/advanced practitioner if interventions unsuccessful or patient reports new pain  Outcome: Progressing     Problem: INFECTION - ADULT  Goal: Absence or prevention of progression during hospitalization  Description: INTERVENTIONS:  - Assess and monitor for signs and symptoms of infection  - Monitor lab/diagnostic results  - Monitor all insertion sites, i e  indwelling lines, tubes, and drains  - Monitor endotracheal if appropriate and nasal secretions for changes in amount and color  - [place] appropriate cooling/warming therapies per order  - Administer medications as ordered  - Instruct and encourage patient and family to use good hand hygiene technique  - Identify and instruct in appropriate isolation precautions for identified infection/condition  Outcome: Progressing     Problem: SAFETY ADULT  Goal: Patient will remain free of falls  Description: INTERVENTIONS:  - Educate patient/family on patient safety including physical limitations  - Instruct patient to call for assistance with activity   - Consult OT/PT to assist with strengthening/mobility   - Keep Call bell within reach  - Keep bed low and locked with side rails adjusted as appropriate  - Keep care items and personal belongings within reach  - Initiate and maintain comfort rounds  - Make Fall Risk Sign visible to staff  Problem: PAIN - ADULT  Goal: Verbalizes/displays adequate comfort level or baseline comfort level  Description: Interventions:  - Encourage patient to monitor pain and request assistance  - Assess pain using appropriate pain scale  - Administer analgesics based on type and severity of pain and evaluate response  - Implement non-pharmacological measures as appropriate and evaluate response  - Consider cultural and social influences on pain and pain management  - Notify physician/advanced practitioner if interventions unsuccessful or patient reports new pain  Outcome: Progressing     Problem: INFECTION - ADULT  Goal: Absence or prevention of progression during hospitalization  Description: INTERVENTIONS:  - Assess and monitor for signs and symptoms of infection  - Monitor lab/diagnostic results  - Monitor all insertion sites, i e  indwelling lines, tubes, and drains  - Monitor endotracheal if appropriate and nasal secretions for changes in amount and color  - [place] appropriate cooling/warming therapies per order  - Administer medications as ordered  - Instruct and encourage patient and family to use good hand hygiene technique  - Identify and instruct in appropriate isolation precautions for identified infection/condition  Outcome: Progressing     Problem: SAFETY ADULT  Goal: Patient will remain free of falls  Description: INTERVENTIONS:  - Educate patient/family on patient safety including physical limitations  - Instruct patient to call for assistance with activity   - Consult OT/PT to assist with strengthening/mobility   - Keep Call bell within reach  - Keep bed low and locked with side rails adjusted as appropriate  - Keep care items and personal belongings within reach  - Initiate and maintain comfort rounds  - Make Fall Risk Sign visible to staff  - Apply yellow socks and bracelet for high fall risk patients  - Consider moving patient to room near nurses station  Outcome: Progressing  Goal: Maintain or return to baseline ADL function  Description: INTERVENTIONS:  -  Assess patient's ability to carry out ADLs; assess patient's baseline for ADL function and identify physical deficits which impact ability to perform ADLs (bathing, care of mouth/teeth, toileting, grooming, dressing, etc )  - Assess/evaluate cause of self-care deficits   - Assess range of motion  - Assess patient's mobility; develop plan if impaired  - Assess patient's need for assistive devices and provide as appropriate  - Encourage maximum independence but intervene and supervise when necessary  - Involve family in performance of ADLs  - Assess for home care needs following discharge   - Consider OT consult to assist with ADL evaluation and planning for discharge  - Provide patient education as appropriate  Outcome: Progressing  Goal: Maintains/Returns to pre admission functional level  Description: INTERVENTIONS:  - Perform BMAT or MOVE assessment daily    - Set and communicate daily mobility goal to care team and patient/family/caregiver     - Collaborate with rehabilitation services on mobility goals if consulted  Problem: DISCHARGE PLANNING  Goal: Discharge to home or other facility with appropriate resources  Description: INTERVENTIONS:  - Identify barriers to discharge w/patient and caregiver  - Arrange for needed discharge resources and transportation as appropriate  - Identify discharge learning needs (meds, wound care, etc )  - Arrange for interpretive services to assist at discharge as needed  - Refer to Case Management Department for coordinating discharge planning if the patient needs post-hospital services based on physician/advanced practitioner order or complex needs related to functional status, cognitive ability, or social support system  Outcome: Progressing     - Out of bed for meals 3 times a day  - Out of bed for toileting  - Record patient progress and toleration of activity level   Outcome: Progressing     - Apply yellow socks and bracelet for high fall risk patients  - Consider moving patient to room near nurses station  Outcome: Progressing  Goal: Maintain or return to baseline ADL function  Description: INTERVENTIONS:  -  Assess patient's ability to carry out ADLs; assess patient's baseline for ADL function and identify physical deficits which impact ability to perform ADLs (bathing, care of mouth/teeth, toileting, grooming, dressing, etc )  - Assess/evaluate cause of self-care deficits   - Assess range of motion  - Assess patient's mobility; develop plan if impaired  - Assess patient's need for assistive devices and provide as appropriate  - Encourage maximum independence but intervene and supervise when necessary  - Involve family in performance of ADLs  - Assess for home care needs following discharge   - Consider OT consult to assist with ADL evaluation and planning for discharge  - Provide patient education as appropriate  Outcome: Progressing  Goal: Maintains/Returns to pre admission functional level  Description: INTERVENTIONS:  - Perform BMAT or MOVE assessment daily    - Set and communicate daily mobility goal to care team and patient/family/caregiver     - Collaborate with rehabilitation services on mobility goals if consulted  - Out of bed for meals 3 times a day  - Out of bed for toileting  - Record patient progress and toleration of activity level   Outcome: Progressing   Hours, in advance of need  - Apply yellow socks and bracelet for high fall risk patients  - Consider moving patient to room near nurses station  Outcome: Progressing

## 2023-06-13 NOTE — PROGRESS NOTES
[facility] Progress Note  Patient: Deepak Westbrook 27 y o  male   MRN: [id number]  PCP: No primary care provider on file  Unit/Bed#: [address] [place] [address] Encounter: [id number]  Date Of Visit: 06/13/23    Problem List:    Principal Problem:    Chest pain  Active Problems:    Methamphetamine use (HCC)    Thickening of wall of gallbladder with pericholecystic fluid    Elevated CK    Electrolyte abnormality    Elevated glucose    Other specified anxiety disorders      Assessment & Plan:    * Chest pain  Assessment & Plan    DOLORES Risk Score    Flowsheet Row Most Recent Value   Age >= 72 0 Filed at: 06/10/2023 5795   Known CAD (stenosis >= 50%) 0 Filed at: 06/10/2023 5643   Recent (<=24 hrs) Service Angina 1 Filed at: 06/10/2023 0921   ST Deviation >= 0 5 mm 0 Filed at: 06/10/2023 8110   3+ CAD Risk Factors (FHx, HTN, HLP, DM, Smoker) 0 Filed at: 06/10/2023 0036   Aspirin Use Past 7 Days 0 Filed at: 06/10/2023 5082   Elevated Cardiac Markers 1 Filed at: 06/10/2023 6953   DOLORES Risk Score (Calculated) 2 Filed at: 06/10/2023 1356        • EKG: Sinus bradycardia  • CXR-No acute cardiopulmonary disease  Noted to have elevated troponin, now trending down   Likely type 2 MI secondary Meth use vs ACS  Echo: EF 70-49%, normal diastolic function  Initially started on IV heparin now discontinued  Cardiology following, input appreciated, denies any chest pain at present  • Status post stress test-abnormal  • S/p cardiac cath today  o Continue medical management  o Follow-up labs, electrolytes  o Monitor for symptoms                     Generalized abdominal pain-resolved as of 6/11/2023  Assessment & Plan  Resolved,  · CT abdomen:   · Diffuse gallbladder wall thickening and pericholecystic edema noted without calcified gallstones  Recommend right upper quadrant ultrasound for better evaluation  No significant intrahepatic or extrahepatic biliary ductal dilatation  · 2    Subtle diffuse wall thickening of the stomach, descending colon, sigmoid colon, and rectum could be related to under distention versus gastritis/colitis  · RUQ ultrasound-pending  · Consider GI/surgical consult if positive findings  · Empiric Rocephin  · Toradol for pain  · Serial abdominal exams  · CTA dissection protocol:  · Trace amount of complex abnormal fluid in the pelvis for a male patient could relate to infectious/inflammatory process or other etiology, correlate clinically  No evidence of intramural hematoma, dissection or aneurysm  · Persistent diffuse gallbladder wall thickening as seen on ultrasound  Sepsis (HCC)-resolved as of 6/14/2023  Assessment & Plan  Evidenced by tachypnea, leukocytosis and possible colitis    · CT abdomen:  · Diffuse gallbladder wall thickening and pericholecystic edema noted without calcified gallstones  Recommend RUQ US for better evaluation  No significant intrahepatic or extrahepatic biliary ductal dilatation  2   Subtle diffuse wall thickening of the stomach, descending  colon, sigmoid colon, and rectum could be related to under  distention versus gastritis/colitis  Recommend correlation  3   No evidence for bowel obstruction, appendicitis, obstructive  uropathy, or free air  4   Small amount of nonspecific pathological free fluid in the  pelvis of uncertain etiology    · Blood cultures- NGTD 24 H  · Pro-Nam/lactic acid- WNL  · Denies any chest pain or abdominal pain  · Tolerating diet  · No obvious signs or symptoms of infection  · Cultures negative, I will discontinue antibiotics          Elevated glucose  Assessment & Plan  · POC-156  · Hemoglobin A1c 5 1    Electrolyte abnormality  Assessment & Plan  · Monitor, replete as needed    Elevated CK  Assessment & Plan  · CK-989 >535  · Likely secondary to primary admission diagnosis  · We will monitor  · Continue IVF    Thickening of wall of gallbladder with pericholecystic fluid  Assessment & Plan  Fluctuant abdominal pain generalized, also history of underlying anxiety    · CT abdomen:Diffuse gallbladder wall thickening and pericholecystic edema noted without calcified gallstones  Recommend right upper quadrant ultrasound for better evaluation  No significant intrahepatic or extrahepatic biliary ductal dilatation  · RUQ US-Diffuse gallbladder wall thickening similar to CT  Ahmadi's sign is negative as patient is medicated  Acute cholecystitis is still in the differential and further management based on clinical criteria  · Denies any abdominal pain  · Tolerating diet  Abdominal exam is benign  · Hepatitis panel-pending  · Serial abdominal exams    Methamphetamine use (HCC)  Assessment & Plan  PTA with symptoms of chest pain    · UDS-congruent with methamphetamine and THC  · Counseled on cessation  · Contraindicated beta-blockers for symptomatic management    Metabolic acidosis, increased anion gap (IAG)-resolved as of 6/11/2023  Assessment & Plan  Resolved  · AG-18  · LA- elevated likely due to underlying cell death from cardiac demand, repeat pending  · Beta hydroxybutyrate- WNL  · Ethanol- WNL  · Continue IVF    Elevated serum creatinine-resolved as of 6/11/2023  Assessment & Plan  · CR-1 33 > 1 17  · Avoid nephrotoxic's  · We will monitor    Other specified anxiety disorders  Assessment & Plan  Possibly undiagnosed anxiety versus drug abuse withdrawal versus IAD    · Psych consulted-input appreciated  · Continue Zyprexa          VTE Pharmacologic Prophylaxis:   Moderate Risk (Score 3-4) - Pharmacological DVT Prophylaxis Ordered: enoxaparin (Lovenox)  Patient Centered Rounds: I performed bedside rounds with nursing staff today  Discussions with Specialists or Other Care Team Provider: Cardiology, psychiatry    Education and Discussions with Family / Patient: Updated  (Family) at bedside  Time Spent for Care: 45 minutes  More than 50% of total time spent on counseling and coordination of care as described above      Current Length of Stay: 3 day(s)  Current Patient Status: Inpatient   Certification Statement: The patient will continue to require additional inpatient hospital stay due to Monitor p o  tolerance and blood pressure  Discharge Plan: Anticipate discharge in 24-48 hrs to home  Code Status: Level 1 - Full Code    Subjective:   Underwent cardiac cath earlier today  Comfortably sleeping  Denies any chest pain or abdominal pain  Tolerated  Denies any difficulty in urination  Denies any nausea vomiting  Blood pressure noted to be low at 95/61  Denies any dizziness or lightheadedness    Objective:     Vitals:   Temp (24hrs), Av 6 °F (36 4 °C), Min:97 4 °F (36 3 °C), Max:97 8 °F (36 6 °C)    Temp:  [97 4 °F (36 3 °C)-97 8 °F (36 6 °C)] 97 8 °F (36 6 °C)  HR:  [62-99] 68  Resp:  [17-20] 17  BP: (108-140)/(62-98) 108/62  SpO2:  [96 %-100 %] 99 %  Body mass index is 25 94 kg/m²  Input and Output Summary (last 24 hours): Intake/Output Summary (Last 24 hours) at 2023 0028  Last data filed at 2023 0013  Gross per 24 hour   Intake 5391 66 ml   Output --   Net 5391 66 ml       Physical Exam:   Physical Exam  Constitutional:       General: He is not in acute distress  HENT:      Head: Normocephalic and atraumatic  Cardiovascular:      Rate and Rhythm: Normal rate  Pulmonary:      Effort: Pulmonary effort is normal  No respiratory distress  Breath sounds: No wheezing, rhonchi or rales  Chest:      Chest wall: No tenderness  Abdominal:      General: Bowel sounds are normal  There is no distension  Palpations: Abdomen is soft  Tenderness: There is no abdominal tenderness  There is no guarding or rebound  Musculoskeletal:      Right lower leg: No edema  Left lower leg: No edema  Skin:     General: Skin is warm and dry  Findings: No rash  Neurological:      General: No focal deficit present  Mental Status: He is alert  Mental status is at baseline        Cranial Nerves: No cranial nerve deficit  Psychiatric:         Mood and Affect: Mood normal          Additional Data:     Labs:  Results from last 7 days   Lab Units 06/12/23  0556 06/11/23  0419 06/10/23  0228   EOS PCT %  --   --  0   HEMATOCRIT % 43 3   < > 43 5   HEMOGLOBIN g/dL 14 6   < > 15 4   LYMPHS PCT %  --   --  7*   MONOS PCT %  --   --  5   NEUTROS PCT %  --   --  88*   PLATELETS Thousands/uL 167   < > 227   WBC Thousand/uL 9 58   < > 18 84*    < > = values in this interval not displayed  Results from last 7 days   Lab Units 06/12/23  0556   ANION GAP mmol/L 7   ALBUMIN g/dL 3 9   ALK PHOS U/L 39   ALT U/L 23   AST U/L 30   BUN mg/dL 14   CALCIUM mg/dL 8 5   CHLORIDE mmol/L 109*   CO2 mmol/L 19*   CREATININE mg/dL 1 05   GLUCOSE RANDOM mg/dL 87   POTASSIUM mmol/L 4 3   SODIUM mmol/L 135   TOTAL BILIRUBIN mg/dL 1 14*     Results from last 7 days   Lab Units 06/10/23  0228   INR  1 13         Results from last 7 days   Lab Units 06/10/23  0228   HEMOGLOBIN A1C % 5 1     Results from last 7 days   Lab Units 06/10/23  1805 06/10/23  1409 06/10/23  0228   LACTIC ACID mmol/L 1 4 3 8*  --    PROCALCITONIN ng/ml  --   --  <0 05       Lines/Drains:  Invasive Devices     Peripheral Intravenous Line  Duration           Peripheral IV 06/10/23 Left;Lateral Antecubital 2 days    Peripheral IV 06/10/23 Right;Upper Arm 2 days                  Telemetry:  Telemetry Orders (From admission, onward)             24 Hour Telemetry Monitoring  Continuous x 24 Hours (Telem)        Question:  Reason for 24 Hour Telemetry  Answer:  Drug overdose known to cause cardiac arrhythmias (e g  - Cocaine, TCA, Amphetamines, Phenothiazines, Digoxin, etc ) Meds known to need cardiac monitoring: Amiodarone, Dopamine, Dobutamine, Phenytoin                 Telemetry Reviewed: Normal Sinus Rhythm  Indication for Continued Telemetry Use: No indication for continued use  Will discontinue                Imaging: Reviewed radiology reports from this admission including: "abdominal/pelvic CT    Recent Cultures (last 7 days):   Results from last 7 days   Lab Units 06/10/23  1414   BLOOD CULTURE  No Growth at 24 hrs  Last 24 Hours Medication List:   Current Facility-Administered Medications   Medication Dose Route Frequency Provider Last Rate   • aspirin  81 mg Oral Daily Angelica Valencia MD     • atorvastatin  80 mg Oral QPM Angelica Valencia MD     • cefTRIAXone  1,000 mg Intravenous Q24H MYLA Baker 1,000 mg (06/12/23 0542)   • diltiazem  30 mg Oral Q8H [address] Tano Alexander MD     • heparin (porcine)  3-20 Units/kg/hr (Order-Specific) Intravenous Titrated Johanne Orr MD 14 Units/kg/hr (06/12/23 2250)   • heparin (porcine)  2,000 Units Intravenous Q6H PRN Johanne Orr MD     • heparin (porcine)  4,000 Units Intravenous Q6H PRN Johanne Orr MD     • hydrOXYzine HCL  25 mg Oral Q6H PRN MYLA Baker     • LORazepam  0 5 mg Intravenous Q4H PRN Angelica Valencia MD     • magnesium Oxide  400 mg Oral BID Angelica Valencia MD     • metroNIDAZOLE  500 mg Oral Q8H [address] MYLA Dumont     • nitroglycerin  0 4 mg Sublingual Q5 Min PRN Angelica Valencia MD     • ondansetron  4 mg Intravenous Q6H PRN Angelica Valencia MD     • pantoprazole  40 mg Oral Early Morning Angelica Valencia MD     • polyethylene glycol  17 g Oral Daily Angelica Valencia MD     • potassium chloride  20 mEq Oral Daily Angelica Valencia MD     • saccharomyces boulardii  250 mg Oral BID Angelica Valencia MD     • senna-docusate sodium  1 tablet Oral HS Angelica Valencia MD     • sodium chloride  125 mL/hr Intravenous Continuous Angelica Valencia  mL/hr (06/13/23 0013)        Today, Patient Was Seen By: Ravi Saldaña MD    ** Please Note: \"This note has been constructed using a voice recognition system  Therefore there may be syntax, spelling, and/or grammatical errors  Please call if you have any questions   \"**  "

## 2023-06-13 NOTE — PROGRESS NOTES
Progress Note - Cardiology   [facility]     Tanner Hernandez 27 y o  male MRN: [id number]  : 1990  [place] [address] [facility] [place] Encounter: [id number]    Assessment and Plan:   1  Chest pain       - Presented on 6/10/23 for evaluation for chest pain  Reports use of meth on evening of 23   - 6/10/23 CTA dissection protocol chest/abdomen/pelvis: No evidence of intramural hematoma, dissection or aneurysm  - 6/10/23 hs troponin: 543 (0 hrs), 1219 (2hrs), 2094 (4hrs)  - 23 hs troponin random: 985   - 23 hs troponin random: 123    - Heparin drip discontinued on morning of 23    - Continue Cardizem 30 mg every 8 hours  - Continue aspirin 81 mg daily and Lipitor 80 daily mg   - 6/10/23 lipid panel: Cholesterol 159, triglycerides 66, HDL 62, LDL 84   - 6/10/23 TTE: LVEF 50-55%  Diastolic function is normal    - 23 nuclear stress test: Abnormal pharmacologic nuclear stress test showing anterior wall perfusion abnormality that is predominantly reversible with decreased ejection fraction of 46%  - Plan for cardiac catheterization today, 23    - Patient reports since admission he has not had any chest pain  - Telemetry reviewed: Patient currently in sinus rhythm, rate 60 bpm     - Continue telemetry      2  Elevated troponin      - 6/10/23 hs troponin: 543 (0 hrs), 1219 (2hrs), 2094 (4hrs)  - 23 hs troponin random: 985   - 23 hs troponin random: 123    - Heparin drip discontinued on morning of 23 as troponin down trending    - Etiology of elevated troponin unknown at this time  Possible type II MI secondary to methamphetamine use  Further cardiac testing required to assist with determination of etiology, plan for cardiac catheterization today  - 6/10/23 TTE: LVEF 50-55%   Diastolic function is normal    - 23 nuclear stress test: Abnormal pharmacologic nuclear stress test showing anterior wall perfusion abnormality that is predominantly "reversible with decreased ejection fraction of 46%  - Plan for cardiac catheterization today, 6/13/23       3  History of methamphetamine use      - Presented on 6/10/23 for evaluation for chest pain  Reports use of meth on evening of 6/09/23  Reported last use in 2020   - 6/10/23 urine drug screen positive for methamphetamine and THC  - Care per primary team      4  Gallbladder thickening noted on CT      - 6/10/23 CT abd/pelvis w/ contrast: Diffuse gallbladder wall thickening and pericholecystic edema noted without calcified gallstones  No significant intrahepatic or extrahepatic biliary ductal dilatation  - 6/10/23 RUQ US: Diffuse gallbladder wall thickening similar to CT  Ahmadi's sign is negative as patient is medicated  Acute cholecystitis is still in the differential and further management based on clinical criteria  - Care per primary team      5  Hypokalemia      - 6/10/23 potassium: 2 9   - 6/12/23 potassium: 4 3   - Replete potassium above 4   - Continue to monitor      6  Hypomagnesia      - 6/10/23 magnesium: 1 7   - 6/11/23 magnesium: 2 0   - Replete magnesium above 2   - Continue to monitor      7  AUBREE      - 6/10/23 creatinine: 1 33    - Creatinine has now normalized  AUBREE resolved  Subjective / Objective:        No acute events overnight  Repeat hs troponin random last night noted 123 down from 985  Heparin drip discontinued this morning  Patient reports feeling anxious regarding plan for cardiac catheterization today  All questions and concerns were addressed, patient agreeable to proceed with cardiac catheterization today  Patient currently denies chest pain, shortness of breath, lightheadedness, dizziness, palpitations, nausea, or vomiting  Vitals: Blood pressure 111/60, pulse 79, temperature (!) 97 4 °F (36 3 °C), resp  rate 18, height 5' 11\" (1 803 m), weight 84 4 kg (186 lb), SpO2 100 %    Vitals:    06/10/23 0842 06/12/23 1225   Weight: 84 4 kg (186 lb) 84 4 kg (186 lb) " Body mass index is 25 94 kg/m²  BP Readings from Last 3 Encounters:   06/13/23 111/60   11/26/21 126/70   10/30/16 138/70     Orthostatic Blood Pressures    Flowsheet Row Most Recent Value   Blood Pressure 111/60 filed at 06/13/2023 0731   Patient Position - Orthostatic VS Sitting filed at 06/12/2023 1515        I/O       06/11 0701  06/12 0700 06/12 0701  06/13 0700 06/13 0701  06/14 0700    P  O   480     I V  (mL/kg)  6184 6 (73 3)     Total Intake(mL/kg)  6664 6 (79)     Urine (mL/kg/hr) 425 (0 2)      Total Output 425      Net -425 +6664 6            Unmeasured Urine Occurrence  3 x         Invasive Devices     Peripheral Intravenous Line  Duration           Peripheral IV 06/10/23 Left;Lateral Antecubital 3 days    Peripheral IV 06/10/23 Right;Upper Arm 3 days                  Intake/Output Summary (Last 24 hours) at 6/13/2023 0815  Last data filed at 6/13/2023 9821  Gross per 24 hour   Intake 6664 58 ml   Output --   Net 6664 58 ml       Physical Exam:   Physical Exam  Vitals reviewed  Constitutional:       General: He is not in acute distress  Cardiovascular:      Rate and Rhythm: Normal rate and regular rhythm  Pulses: Normal pulses  Heart sounds: Murmur heard  Pulmonary:      Effort: Pulmonary effort is normal  No respiratory distress  Breath sounds: Normal breath sounds  Abdominal:      General: Abdomen is flat  There is no distension  Palpations: Abdomen is soft  Tenderness: There is no abdominal tenderness  Musculoskeletal:      Right lower leg: No edema  Left lower leg: No edema  Skin:     General: Skin is warm and dry  Neurological:      Mental Status: He is alert and oriented to person, place, and time         Medications/ Allergies:     Current Facility-Administered Medications   Medication Dose Route Frequency Provider Last Rate   • aspirin  81 mg Oral Daily Megha Reyes MD     • atorvastatin  80 mg Oral QPM Megha Reyes MD     • cefTRIAXone  1,000 mg Intravenous Q24H MYLA Baker 1,000 mg (06/13/23 0556)   • diltiazem  30 mg Oral Q8H [address] Kerri Rebollar MD     • [START ON 6/14/2023] enoxaparin  40 mg Subcutaneous Q24H [address] Felicitas Purdy PA-C     • hydrOXYzine HCL  25 mg Oral Q6H PRN MYLA Baker     • LORazepam  0 5 mg Intravenous Q4H PRN Kenneth Colbert MD     • magnesium Oxide  400 mg Oral BID Kenneth Colbert MD     • metroNIDAZOLE  500 mg Oral [facility] MYLA Frances     • nitroglycerin  0 4 mg Sublingual Q5 Min PRN Kenneth Colbert MD     • ondansetron  4 mg Intravenous Q6H PRN Kenneth Colbert MD     • pantoprazole  40 mg Oral Early Morning Kenneth Colbert MD     • polyethylene glycol  17 g Oral Daily Kenneth Colbert MD     • potassium chloride  20 mEq Oral Daily Kenneth Colbert MD     • saccharomyces boulardii  250 mg Oral BID Kenneth Colbert MD     • senna-docusate sodium  1 tablet Oral HS Kenneth Colbert MD     • sodium chloride  125 mL/hr Intravenous Continuous Kenneth Colbert  mL/hr (06/13/23 0013)     hydrOXYzine HCL, 25 mg, Q6H PRN  LORazepam, 0 5 mg, Q4H PRN  nitroglycerin, 0 4 mg, Q5 Min PRN  ondansetron, 4 mg, Q6H PRN      No Known Allergies    VTE Pharmacologic Prophylaxis:   Enoxaparin (Lovenox)    Labs:   Troponins:  Results from last 7 days   Lab Units 06/12/23  2218 06/11/23  0419 06/10/23  8679 06/10/23  0437 06/10/23  0228   CK TOTAL U/L  --  535*  --   --  989*   HS TNI RAND ng/L 123* 985*  --   --   --    HSTNI D2 ng/L  --   --   --  676*  --    HSTNI D4 ng/L  --   --  1,551*  --   --          CBC with diff:  Results from last 7 days   Lab Units 06/13/23  0458 06/12/23  0556 06/11/23  0419 06/10/23  0228   HEMATOCRIT % 37 3 43 3 42 2 43 5   HEMOGLOBIN g/dL 12 7 14 6 14 8 15 4   MCH pg 31 2 31 2 31 6 31 3   MCHC g/dL 34 0 33 7 35 1 35 4   MCV fL 92 93 90 88   MPV fL 11 0 11 5 11 5 11 6   NRBC AUTO /100 WBCs  --   --   --  0   PLATELETS Thousands/uL 154 167 191 227   RBC Million/uL 4 07 4 68 4 68 4 92   RDW % 12 9 13 0 "12 9 11 9   WBC Thousand/uL 6 23 9 58 14  05* 18 84*       CMP:  Results from last 7 days   Lab Units 06/13/23  0458 06/12/23  0556 06/11/23  0419 06/10/23  0228   ANION GAP mmol/L 5 7 10 18*   ALBUMIN g/dL 3 5 3 9 4 3 5 1*   ALK PHOS U/L 34 39 44 42   ALT U/L 35 23 26 31   AST U/L 37 30 41* 45*   BUN mg/dL 14 14 13 25   CALCIUM mg/dL 8 4 8 5 8 9 9 8   CHLORIDE mmol/L 109* 109* 104 95*   CO2 mmol/L 23 19* 20* 19*   CREATININE mg/dL 1 08 1 05 1 17 1 33*   EGFR ml/min/1 73sq m 89 92 80 69   POTASSIUM mmol/L 4 1 4 3 3 7 2 9*   SODIUM mmol/L 137 135 134* 132*   TOTAL BILIRUBIN mg/dL 0 82 1 14* 1 44* 0 72   TOTAL PROTEIN g/dL 5 7* 6 4 7 0 8 1       Magnesium:  Results from last 7 days   Lab Units 06/11/23  0419 06/10/23  0228   MAGNESIUM mg/dL 2 0 1 7*     Coags:  Results from last 7 days   Lab Units 06/13/23  0458 06/12/23  2214 06/12/23  1119 06/11/23  1034 06/11/23  0419 06/10/23  2145 06/10/23  1409 06/10/23  0228   INR   --   --   --   --   --   --   --  1 13   PTT seconds 84* 105* 94* 88* 88* 59* 35 28     TSH:    No components found for: \"TSH3\"  Lipid Profile:  Results from last 7 days   Lab Units 06/10/23  0228   CHOLESTEROL mg/dL 159   HDL mg/dL 62   LDL CALC mg/dL 84   TRIGLYCERIDES mg/dL 66     Hgb A1c:  Results from last 7 days   Lab Units 06/10/23  0228   HEMOGLOBIN A1C % 5 1     NT-proBNP: No results for input(s): \"NTBNP\" in the last 72 hours  Imaging & Testing   I have personally reviewed pertinent reports  XR abdomen 1 view kub     Result Date: 6/12/2023     Impression: Unremarkable abdomen      CTA dissection protocol chest/abdomen/pelvis     Result Date: 6/10/2023     Impression: Trace amount of complex abnormal fluid in the pelvis for a male patient could relate to infectious/inflammatory process or other etiology, correlate clinically  No evidence of intramural hematoma, dissection or aneurysm   Persistent diffuse gallbladder wall thickening as seen on ultrasound       US right upper " quadrant     Result Date: 6/10/2023     Impression: Diffuse gallbladder wall thickening similar to CT  Ahmadi's sign is negative as patient is medicated  Acute cholecystitis is still in the differential and further management based on clinical criteria      XR chest 1 view portable     Result Date: 6/10/2023     Impression: No acute cardiopulmonary disease       CT abdomen pelvis with contrast     Result Date: 6/10/2023     Impression: 1  Diffuse gallbladder wall thickening and pericholecystic edema noted without calcified gallstones  Recommend right upper quadrant ultrasound for better evaluation  No significant intrahepatic or extrahepatic biliary ductal dilatation  2   Subtle diffuse wall thickening of the stomach, descending colon, sigmoid colon, and rectum could be related to under distention versus gastritis/colitis  Recommend clinical correlation  3   No evidence for bowel obstruction, appendicitis, obstructive uropathy, or free air  4   Small amount of nonspecific pathological free fluid in the pelvis of uncertain etiology  EKG / Monitor: Personally reviewed  Telemetry reviewed: Currently sinus rhythm, rate 60 bpm   No events noted on telemetry  Cardiac testing:   Echo complete w/ contrast if indicated    Result Date: 6/10/2023  Narrative: •  Left Ventricle: Left ventricular cavity size is normal  Wall thickness is normal  Systolic function is normal   Estimated ejection fraction is 50-55%  Although no diagnostic regional wall motion abnormality was identified, this possibility cannot be completely excluded on the basis of this study  Diastolic function is normal  •  Right Ventricle: Right ventricular cavity size is mildly dilated  Systolic function is normal  •  Right Atrium: The atrium is mildly dilated  •  Mitral Valve: There is mild regurgitation  •  Tricuspid Valve: There is trace regurgitation  •  Pulmonic Valve: There is trace regurgitation       Results for orders placed during the hospital encounter of 06/10/23    NM Myocardial Perfusion Spect (Pharmacological Induced Stress and/or Rest)    Interpretation Summary  •  Abnormal pharmacologic nuclear stress test showing anterior wall perfusion abnormality that is predominantly reversible with decreased ejection fraction of 46%  •  Resting EKG showed poor R wave progression  The stress ECG is negative for ischemia after vasodilation and low level exercise, without reproduction of symptoms  •  Perfusion: There is a left ventricular perfusion defect that is small to medium in size present in the anterior location(s) that is predominantly reversible  •  Stress Function: Left ventricular function post-stress is abnormal  Global function is mildly reduced  Collated ejection fraction was 46%  •  Perfusion Defect Conclusion: There is no evidence of transient ischemic dilation (TID)              Manny Munson PA-C

## 2023-06-13 NOTE — PLAN OF CARE
Problem: Potential for Falls  Goal: Patient will remain free of falls  Description: INTERVENTIONS:  - Educate patient/family on patient safety including physical limitations  - Instruct patient to call for assistance with activity   - Consult OT/PT to assist with strengthening/mobility   - Keep Call bell within reach  - Keep bed low and locked with side rails adjusted as appropriate  - Keep care items and personal belongings within reach  - Initiate and maintain comfort rounds  - Make Fall Risk Sign visible to staff  - Offer Toileting every 2  Hours, in advance of need  - Obtain necessary fall risk management equipment:   - Apply yellow socks and bracelet for high fall risk patients  - Consider moving patient to room near nurses station  Outcome: Progressing     Problem: CARDIOVASCULAR - ADULT  Goal: Maintains optimal cardiac output and hemodynamic stability  Description: INTERVENTIONS:  - Monitor I/O, vital signs and rhythm  - Monitor for S/S and trends of decreased cardiac output  - Administer and titrate ordered vasoactive medications to optimize hemodynamic stability  - Assess quality of pulses, skin color and temperature  - Assess for signs of decreased coronary artery perfusion  - Instruct patient to report change in severity of symptoms  Outcome: Progressing     Problem: CARDIOVASCULAR - ADULT  Goal: Absence of cardiac dysrhythmias or at baseline rhythm  Description: INTERVENTIONS:  - Continuous cardiac monitoring, vital signs, obtain 12 lead EKG if ordered  - Administer antiarrhythmic and heart rate control medications as ordered  - Monitor electrolytes and administer replacement therapy as ordered  Outcome: Progressing     Problem: PAIN - ADULT  Goal: Verbalizes/displays adequate comfort level or baseline comfort level  Description: Interventions:  - Encourage patient to monitor pain and request assistance  - Assess pain using appropriate pain scale  - Administer analgesics based on type and severity of pain and evaluate response  - Implement non-pharmacological measures as appropriate and evaluate response  - Consider cultural and social influences on pain and pain management  - Notify physician/advanced practitioner if interventions unsuccessful or patient reports new pain  Outcome: Progressing     Problem: INFECTION - ADULT  Goal: Absence or prevention of progression during hospitalization  Description: INTERVENTIONS:  - Assess and monitor for signs and symptoms of infection  - Monitor lab/diagnostic results  - Monitor all insertion sites, i e  indwelling lines, tubes, and drains  - Monitor endotracheal if appropriate and nasal secretions for changes in amount and color  - [place] appropriate cooling/warming therapies per order  - Administer medications as ordered  - Instruct and encourage patient and family to use good hand hygiene technique  - Identify and instruct in appropriate isolation precautions for identified infection/condition  Outcome: Progressing     Problem: SAFETY ADULT  Goal: Maintains/Returns to pre admission functional level  Description: INTERVENTIONS:  - Perform BMAT or MOVE assessment daily    - Set and communicate daily mobility goal to care team and patient/family/caregiver  - Collaborate with rehabilitation services on mobility goals if consulted  - Perform Range of Motion 3 times a day    - Dangle patient 3 times a day  - Stand patient 3 times a day  - Ambulate patient 3 times a day  - Out of bed to chair 3 times a day   - Out of bed for meals 3 times a day  - Out of bed for toileting  - Record patient progress and toleration of activity level   Outcome: Progressing     Problem: DISCHARGE PLANNING  Goal: Discharge to home or other facility with appropriate resources  Description: INTERVENTIONS:  - Identify barriers to discharge w/patient and caregiver  - Arrange for needed discharge resources and transportation as appropriate  - Identify discharge learning needs (meds, wound care, etc )  - Arrange for interpretive services to assist at discharge as needed  - Refer to Case Management Department for coordinating discharge planning if the patient needs post-hospital services based on physician/advanced practitioner order or complex needs related to functional status, cognitive ability, or social support system  Outcome: Progressing
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
## 2023-06-14 VITALS
HEIGHT: 71 IN | SYSTOLIC BLOOD PRESSURE: 127 MMHG | BODY MASS INDEX: 26.04 KG/M2 | RESPIRATION RATE: 20 BRPM | OXYGEN SATURATION: 98 % | DIASTOLIC BLOOD PRESSURE: 82 MMHG | TEMPERATURE: 98.3 F | WEIGHT: 186 LBS | HEART RATE: 70 BPM

## 2023-06-14 PROBLEM — A41.9 SEPSIS (HCC): Status: RESOLVED | Noted: 2023-06-10 | Resolved: 2023-06-14

## 2023-06-14 LAB
ALBUMIN SERPL BCP-MCNC: 3.8 G/DL (ref 3.5–5)
ALP SERPL-CCNC: 36 U/L (ref 34–104)
ALT SERPL W P-5'-P-CCNC: 80 U/L (ref 7–52)
ANION GAP SERPL CALCULATED.3IONS-SCNC: 5 MMOL/L (ref 4–13)
AST SERPL W P-5'-P-CCNC: 94 U/L (ref 13–39)
BILIRUB SERPL-MCNC: 0.6 MG/DL (ref 0.2–1)
BUN SERPL-MCNC: 14 MG/DL (ref 5–25)
CALCIUM SERPL-MCNC: 8.8 MG/DL (ref 8.4–10.2)
CHLORIDE SERPL-SCNC: 107 MMOL/L (ref 96–108)
CO2 SERPL-SCNC: 26 MMOL/L (ref 21–32)
CREAT SERPL-MCNC: 1.09 MG/DL (ref 0.6–1.3)
ERYTHROCYTE [DISTWIDTH] IN BLOOD BY AUTOMATED COUNT: 12.7 % (ref 11.6–15.1)
GFR SERPL CREATININE-BSD FRML MDRD: 87 ML/MIN/1.73SQ M
GLUCOSE SERPL-MCNC: 86 MG/DL (ref 65–140)
HCT VFR BLD AUTO: 37.2 % (ref 36.5–49.3)
HGB BLD-MCNC: 12.9 G/DL (ref 12–17)
MAGNESIUM SERPL-MCNC: 1.8 MG/DL (ref 1.9–2.7)
MCH RBC QN AUTO: 31.9 PG (ref 26.8–34.3)
MCHC RBC AUTO-ENTMCNC: 34.7 G/DL (ref 31.4–37.4)
MCV RBC AUTO: 92 FL (ref 82–98)
PLATELET # BLD AUTO: 148 THOUSANDS/UL (ref 149–390)
PMV BLD AUTO: 11.1 FL (ref 8.9–12.7)
POTASSIUM SERPL-SCNC: 3.8 MMOL/L (ref 3.5–5.3)
PROT SERPL-MCNC: 6.2 G/DL (ref 6.4–8.4)
RBC # BLD AUTO: 4.04 MILLION/UL (ref 3.88–5.62)
SODIUM SERPL-SCNC: 138 MMOL/L (ref 135–147)
TSH SERPL DL<=0.05 MIU/L-ACNC: 1.88 UIU/ML (ref 0.45–4.5)
WBC # BLD AUTO: 6.18 THOUSAND/UL (ref 4.31–10.16)

## 2023-06-14 PROCEDURE — 99239 HOSP IP/OBS DSCHRG MGMT >30: CPT | Performed by: INTERNAL MEDICINE

## 2023-06-14 PROCEDURE — 84443 ASSAY THYROID STIM HORMONE: CPT | Performed by: INTERNAL MEDICINE

## 2023-06-14 PROCEDURE — 83735 ASSAY OF MAGNESIUM: CPT | Performed by: INTERNAL MEDICINE

## 2023-06-14 PROCEDURE — 80053 COMPREHEN METABOLIC PANEL: CPT | Performed by: STUDENT IN AN ORGANIZED HEALTH CARE EDUCATION/TRAINING PROGRAM

## 2023-06-14 PROCEDURE — 85027 COMPLETE CBC AUTOMATED: CPT | Performed by: INTERNAL MEDICINE

## 2023-06-14 RX ORDER — LANOLIN ALCOHOL/MO/W.PET/CERES
400 CREAM (GRAM) TOPICAL DAILY
Qty: 30 TABLET | Refills: 0 | Status: SHIPPED | OUTPATIENT
Start: 2023-06-15

## 2023-06-14 RX ORDER — LANOLIN ALCOHOL/MO/W.PET/CERES
400 CREAM (GRAM) TOPICAL DAILY
Status: DISCONTINUED | OUTPATIENT
Start: 2023-06-15 | End: 2023-06-14 | Stop reason: HOSPADM

## 2023-06-14 RX ADMIN — Medication 250 MG: at 09:52

## 2023-06-14 RX ADMIN — PANTOPRAZOLE SODIUM 40 MG: 40 TABLET, DELAYED RELEASE ORAL at 05:04

## 2023-06-14 RX ADMIN — ASPIRIN 81 MG CHEWABLE TABLET 81 MG: 81 TABLET CHEWABLE at 09:52

## 2023-06-14 RX ADMIN — Medication 250 MG: at 18:33

## 2023-06-14 RX ADMIN — POLYETHYLENE GLYCOL 3350 17 G: 17 POWDER, FOR SOLUTION ORAL at 09:52

## 2023-06-14 RX ADMIN — ENOXAPARIN SODIUM 40 MG: 40 INJECTION SUBCUTANEOUS at 09:51

## 2023-06-14 RX ADMIN — MAGNESIUM OXIDE TAB 400 MG (241.3 MG ELEMENTAL MG) 400 MG: 400 (241.3 MG) TAB at 09:52

## 2023-06-14 NOTE — INCIDENTAL FINDINGS
The following findings require follow up:  Radiographic finding   Finding: CT scan on admission revealed  1   Diffuse gallbladder wall thickening and pericholecystic edema noted without calcified gallstones   Subsequent ultrasound of the gallbladder revealed gallbladder wall thickening without any gallstones  2   Subtle diffuse wall thickening of the stomach, descending colon, sigmoid colon, and rectum could be related to under distention versus gastritis/colitis  3    Small amount of nonspecific pathological free fluid in the pelvis of uncertain etiology      Follow up required: Yes   Follow up should be done within 1-2 week(s)    Please notify the following clinician to assist with the follow up:   PCP

## 2023-06-14 NOTE — CASE MANAGEMENT
Case Management Assessment & Discharge Planning Note    Patient name aGgan Ma  Location [address] [facility] [address]-* MRN [id number]  : 1990 Date 2023       Current Admission Date: 6/10/2023  Current Admission Diagnosis:Chest pain   Patient Active Problem List    Diagnosis Date Noted   • Other specified anxiety disorders 2023   • Chest pain 06/10/2023   • Methamphetamine use ([address] ) 06/10/2023   • Thickening of wall of gallbladder with pericholecystic fluid 06/10/2023   • Elevated CK 06/10/2023   • Electrolyte abnormality 06/10/2023   • Elevated glucose 06/10/2023      LOS (days): 4  Geometric Mean LOS (GMLOS) (days):   Days to GMLOS:     OBJECTIVE:    Risk of Unplanned Readmission Score: 14 66       Current admission status: Inpatient  Referral Reason: Other (Discharge planning)    Preferred Pharmacy:   [facility]/pharmacy #[id number] Nallely, [phone] [facility] [place]  [address] [address]  Phone: [phone] Fax: [phone] Baljit Lyn, [address]  [address] [place] [address]  Phone: [phone] Fax: [phone]    Primary Care Provider: No primary care provider on file  Primary Insurance: [address] [place], [place] [id number]  Secondary Insurance:     ASSESSMENT:  Active Health Care Proxies    There are no active Health Care Proxies on file          Readmission Root Cause  30 Day Readmission: No    Patient Information  Admitted from[de-identified] Home  Mental Status: Alert  During Assessment patient was accompanied by: Not accompanied during assessment  Assessment information provided by[de-identified] Patient  Primary Caregiver: Self  Support Systems: Spouse/significant other, [address] of Residence: [address] [place] [address] do you live in?: [place]  Type of Current Residence: Apartment  Living Arrangements: Lives w/ Spouse/significant other    Activities of Daily Living Prior to Admission  Functional Status: Independent  Completes ADLs independently?: Yes  Ambulates independently?: Yes  Does patient use assisted devices?: No  Does patient currently own DME?: No  Does patient currently have [facility]?: No    Patient Information Continued  Income Source: Unemployed  Does patient have prescription coverage?: Yes  Does patient receive dialysis treatments?: No  Does patient have a history of substance abuse?: Yes  Historical substance use preference: Methamphetamines  Is patient currently in treatment for substance abuse?: No  Patient declined treatment information  Does patient have a history of Mental Health Diagnosis?: No      DISCHARGE DETAILS:    Discharge planning discussed with[de-identified] Patient  Freedom of Choice: Yes     Comments - Freedom of Choice: SW spoke with patient at bedside to introduce role of CM, conduct assessment and discuss discharge planning  Patient's preference is to return home where he lives with his significant other at discharge  He will need a Lyft home  SW spoke with patient about his recent history of methamphetamine use and offered resources for treatment  Patient declined, stating that he had both inpatient and outpatient rehab after being incarcerated and that this was a relapse for which he is disappointed in himself  Patient stated that he has resources available to call if he needs after discharge  SW will continue to follow         CM contacted family/caregiver?: No- see comments (Declined call to emergency contact)  Were Treatment Team discharge recommendations reviewed with patient/caregiver?: Yes  Did patient/caregiver verbalize understanding of patient care needs?: Yes  Were patient/caregiver advised of the risks associated with not following Treatment Team discharge recommendations?: Yes    Requested [address]         Is the patient interested in [facility] at discharge?: No    DME Referral Provided  Referral made for DME?: No    Other Referral/Resources/Interventions Provided:  Interventions: None Indicated    Would you like to participate in our [address] service program?  : No - Declined    Treatment Team Recommendation: Home  Discharge Destination Plan[de-identified] Home  Transport at Discharge : Samia Romero

## 2023-06-14 NOTE — DISCHARGE SUMMARY
Discharge Summary - [facility]    Patient Information: Yasmany Stark 26 y o  male MRN: [id number]  Unit/Bed#: [address] [facility] Encounter: [id number]    Discharging Physician / Practitioner: Jessie Wahl MD  PCP: No primary care provider on file    Admission Date: 6/10/2023  Discharge Date: 06/14/23    Reason for Admission: Chest Pain (Patient c/o chest pain and sob after doing meth, has scratch marks from self on left side abdomen, patient feels very out of breath and can't give detailed information)      Discharge Diagnoses:     Principal Problem:    Chest pain  Active Problems:    Methamphetamine use (HCC)    Thickening of wall of gallbladder with pericholecystic fluid    Elevated CK    Electrolyte abnormality    Elevated glucose    Other specified anxiety disorders  Resolved Problems:    Sepsis (HCC)    Generalized abdominal pain    Elevated serum creatinine    Metabolic acidosis, increased anion gap (IAG)        * Chest pain  Assessment & Plan    DOLORES Risk Score    Flowsheet Row Most Recent Value   Age >= 72 0 Filed at: 06/10/2023 1031   Known CAD (stenosis >= 50%) 0 Filed at: 06/10/2023 4481   Recent (<=24 hrs) Service Angina 1 Filed at: 06/10/2023 0921   ST Deviation >= 0 5 mm 0 Filed at: 06/10/2023 3029   3+ CAD Risk Factors (FHx, HTN, HLP, DM, Smoker) 0 Filed at: 06/10/2023 2430   Aspirin Use Past 7 Days 0 Filed at: 06/10/2023 4421   Elevated Cardiac Markers 1 Filed at: 06/10/2023 8981   DOLORES Risk Score (Calculated) 2 Filed at: 06/10/2023 7728        • EKG: Sinus bradycardia  • CXR-No acute cardiopulmonary disease  Noted to have elevated troponin, now trending down   Likely type 2 MI secondary Meth use vs ACS  Echo: EF 16-88%, normal diastolic function  Initially started on IV heparin now discontinued  Cardiology following, input appreciated, denies any chest pain at present  • Status post stress test-abnormal  • S/p cardiac cath today  o Continue medical management  o Follow-up labs, electrolytes  o Monitor for symptoms                     Generalized abdominal pain-resolved as of 6/11/2023  Assessment & Plan  Resolved,  · CT abdomen:   · Diffuse gallbladder wall thickening and pericholecystic edema noted without calcified gallstones  Recommend right upper quadrant ultrasound for better evaluation  No significant intrahepatic or extrahepatic biliary ductal dilatation  · 2  Subtle diffuse wall thickening of the stomach, descending colon, sigmoid colon, and rectum could be related to under distention versus gastritis/colitis  · RUQ ultrasound-pending  · Consider GI/surgical consult if positive findings  · Empiric Rocephin  · Toradol for pain  · Serial abdominal exams  · CTA dissection protocol:  · Trace amount of complex abnormal fluid in the pelvis for a male patient could relate to infectious/inflammatory process or other etiology, correlate clinically  No evidence of intramural hematoma, dissection or aneurysm  · Persistent diffuse gallbladder wall thickening as seen on ultrasound  Sepsis (HCC)-resolved as of 6/14/2023  Assessment & Plan  Evidenced by tachypnea, leukocytosis and possible colitis    · CT abdomen:  · Diffuse gallbladder wall thickening and pericholecystic edema noted without calcified gallstones  Recommend RUQ US for better evaluation  No significant intrahepatic or extrahepatic biliary ductal dilatation  2   Subtle diffuse wall thickening of the stomach, descending  colon, sigmoid colon, and rectum could be related to under  distention versus gastritis/colitis  Recommend correlation  3   No evidence for bowel obstruction, appendicitis, obstructive  uropathy, or free air  4   Small amount of nonspecific pathological free fluid in the  pelvis of uncertain etiology    · Blood cultures- NGTD 24 H  · Pro-Nam/lactic acid- WNL  · Denies any chest pain or abdominal pain  · Tolerating diet  · No obvious signs or symptoms of infection  · Cultures negative, I will discontinue antibiotics          Elevated glucose  Assessment & Plan  · POC-156  · Hemoglobin A1c 5 1    Electrolyte abnormality  Assessment & Plan  · Monitor, replete as needed    Elevated CK  Assessment & Plan  · CK-989 >535  · Likely secondary to primary admission diagnosis  · We will monitor  · Continue IVF    Thickening of wall of gallbladder with pericholecystic fluid  Assessment & Plan  Fluctuant abdominal pain generalized, also history of underlying anxiety    · CT abdomen:Diffuse gallbladder wall thickening and pericholecystic edema noted without calcified gallstones  Recommend right upper quadrant ultrasound for better evaluation  No significant intrahepatic or extrahepatic biliary ductal dilatation  · RUQ US-Diffuse gallbladder wall thickening similar to CT  Ahmadi's sign is negative as patient is medicated  Acute cholecystitis is still in the differential and further management based on clinical criteria  · Denies any abdominal pain  · Tolerating diet    Abdominal exam is benign  · Hepatitis panel-pending  · Serial abdominal exams    Methamphetamine use (HCC)  Assessment & Plan  PTA with symptoms of chest pain    · UDS-congruent with methamphetamine and THC  · Counseled on cessation  · Contraindicated beta-blockers for symptomatic management    Metabolic acidosis, increased anion gap (IAG)-resolved as of 6/11/2023  Assessment & Plan  Resolved  · AG-18  · LA- elevated likely due to underlying cell death from cardiac demand, repeat pending  · Beta hydroxybutyrate- WNL  · Ethanol- WNL  · Continue IVF    Elevated serum creatinine-resolved as of 6/11/2023  Assessment & Plan  · CR-1 33 > 1 17  · Avoid nephrotoxic's  · We will monitor    Other specified anxiety disorders  Assessment & Plan  Possibly undiagnosed anxiety versus drug abuse withdrawal versus IAD    · Psych consulted-input appreciated  · Continue Zyprexa        Consultations During Hospital Stay:  IP CONSULT TO CARDIOLOGY  IP CONSULT TO PSYCHIATRY    Procedures Performed:     · Cardiac catheterization    Significant Findings:     · Refer to hospital course and above listed diagnosis related plan for details    Imaging while in hospital:      NM Myocardial Perfusion Spect (Pharmacological Induced Stress and/or Rest)    Result Date: 6/12/2023  Narrative: •  Abnormal pharmacologic nuclear stress test showing anterior wall perfusion abnormality that is predominantly reversible with decreased ejection fraction of 46% •  Resting EKG showed poor R wave progression  The stress ECG is negative for ischemia after vasodilation and low level exercise, without reproduction of symptoms  •  Perfusion: There is a left ventricular perfusion defect that is small to medium in size present in the anterior location(s) that is predominantly reversible  •  Stress Function: Left ventricular function post-stress is abnormal  Global function is mildly reduced  Collated ejection fraction was 46% •  Perfusion Defect Conclusion: There is no evidence of transient ischemic dilation (TID)  XR abdomen 1 view kub    Result Date: 6/12/2023  Narrative: ABDOMEN INDICATION:  Abdominal pain  COMPARISON: CT chest, abdomen and pelvis 6/10/2023 VIEWS:  AP supine Images: 3 FINDINGS: There is a nonobstructive bowel gas pattern  No discernible free air on this supine study  Upright or left lateral decubitus imaging is more sensitive to detect subtle free air in the appropriate setting  No pathologic calcifications or soft tissue masses  Visualized lung bases are clear  Visualized osseous structures are unremarkable for the patient's age  Impression: Unremarkable abdomen  Workstation performed: [id number]     CTA dissection protocol chest/abdomen/pelvis    Result Date: 6/10/2023  Narrative: CTA - CHEST, ABDOMEN AND PELVIS - WITHOUT AND WITH IV CONTRAST INDICATION:   Meth use with chest pain, abdominal pain  COMPARISON: Same date CT and ultrasound   TECHNIQUE: CT examination of the chest, abdomen and pelvis was performed both prior to and after the administration of intravenous contrast    The noncontrast portion of this examination was performed utilizing low radiation dose technique  Thin section angiographic arterial phase post contrast technique was used in order to evaluate for aortic dissection  3D reformatted images and volume rendering were performed on an independent workstation  Multiplanar 2D reformatted images were created from the source data  Radiation dose length product (DLP) for this visit:  7331 mGy-cm   This examination, like all CT scans performed in the [facility], was performed utilizing techniques to minimize radiation dose exposure, including the use of iterative reconstruction and automated exposure control  IV Contrast:  100 mL of iohexol (OMNIPAQUE) Enteric Contrast:  Enteric contrast was not administered  FINDINGS: AORTA:  There is no aortic dissection or intramural hematoma  There is no aortic aneurysm  No significant atherosclerotic disease  Specifically, no flow limiting atherosclerotic stenosis of aorta or major aortic branch vessel in the chest, abdomen or pelvis  CHEST LUNGS:  Lungs are clear  There is no tracheal or endobronchial lesion  PLEURA:  Unremarkable  HEART/PULMONARY ARTERIAL TREE:  Unremarkable for patient's age  MEDIASTINUM AND EDUARDO:  Unremarkable  CHEST WALL AND LOWER NECK:  Unremarkable  ABDOMEN LIVER/BILIARY TREE:  Unremarkable  GALLBLADDER: Gallbladder wall thickening as seen on ultrasound performed today  SPLEEN:  Unremarkable  PANCREAS:  Unremarkable  ADRENAL GLANDS:  Unremarkable  KIDNEYS/URETERS:  Unremarkable  No hydronephrosis  STOMACH AND BOWEL:  Unremarkable  APPENDIX:  No findings to suggest appendicitis   ABDOMINOPELVIC CAVITY: Abnormal small fluid in the pelvis measuring approximately 30 Hounsfield units, abnormal for male patient could relate to infectious/inflammatory etiology PELVIS REPRODUCTIVE ORGANS: Unremarkable for patient's age  URINARY BLADDER:  Unremarkable  ABDOMINAL WALL/INGUINAL REGIONS:  Unremarkable  OSSEOUS STRUCTURES:  No acute fracture or destructive osseous lesion  Impression: Trace amount of complex abnormal fluid in the pelvis for a male patient could relate to infectious/inflammatory process or other etiology, correlate clinically  No evidence of intramural hematoma, dissection or aneurysm  Persistent diffuse gallbladder wall thickening as seen on ultrasound  Workstation performed: [id number]     Echo complete w/ contrast if indicated    Result Date: 6/10/2023  Narrative: •  Left Ventricle: Left ventricular cavity size is normal  Wall thickness is normal  Systolic function is normal   Estimated ejection fraction is 50-55%  Although no diagnostic regional wall motion abnormality was identified, this possibility cannot be completely excluded on the basis of this study  Diastolic function is normal  •  Right Ventricle: Right ventricular cavity size is mildly dilated  Systolic function is normal  •  Right Atrium: The atrium is mildly dilated  •  Mitral Valve: There is mild regurgitation  •  Tricuspid Valve: There is trace regurgitation  •  Pulmonic Valve: There is trace regurgitation  US right upper quadrant    Result Date: 6/10/2023  Narrative: RIGHT UPPER QUADRANT ULTRASOUND INDICATION:     r/o jovana  COMPARISON:  None TECHNIQUE:   Real-time ultrasound of the right upper quadrant was performed with a curvilinear transducer with both volumetric sweeps and still imaging techniques  FINDINGS: PANCREAS:  Visualized portions of the pancreas are within normal limits  AORTA AND IVC:  Visualized portions are normal for patient age  LIVER: Size:  Within normal range  The liver measures 15 5 cm in the midclavicular line  Contour:  Surface contour is smooth  Parenchyma:  Echogenicity and echotexture are within normal limits  No liver mass identified   Limited imaging of the main portal vein shows it to be patent and hepatopetal  BILIARY: Diffuse gallbladder wall thickening measuring 11 mm  Charma Slain sign is negative, however patient is medicated  No intrahepatic biliary dilatation  CBD measures 5 0 mm  No choledocholithiasis  KIDNEY: Right kidney measures 11 8 x 5 4 x 5 0 cm  Volume 168 3 mL Kidney within normal limits  ASCITES:   None  Impression: Diffuse gallbladder wall thickening similar to CT  Ahmadi's sign is negative as patient is medicated  Acute cholecystitis is still in the differential and further management based on clinical criteria  Workstation performed: [id number]     XR chest 1 view portable    Result Date: 6/10/2023  Narrative: CHEST INDICATION:   chest pain sob  COMPARISON: 3/5/2016 CT EXAM PERFORMED/VIEWS:  XR CHEST PORTABLE Single view FINDINGS: Cardiomediastinal silhouette appears unremarkable  The lungs are clear  No pneumothorax or pleural effusion  Osseous structures appear within normal limits for patient age  Impression: No acute cardiopulmonary disease  Findings are stable Workstation performed: [id number]     CT abdomen pelvis with contrast    Result Date: 6/10/2023  Narrative: CT ABDOMEN AND PELVIS WITH IV CONTRAST INDICATION:   r/o appendicitis, low abd pain, also on meth and can't provide hx  COMPARISON: 3/5/2016  TECHNIQUE:  CT examination of the abdomen and pelvis was performed  Multiplanar 2D reformatted images were created from the source data  This examination, like all CT scans performed in the [facility], was performed utilizing techniques to minimize radiation dose exposure, including the use of iterative reconstruction and automated exposure control  Radiation dose length product (DLP) for this visit:  484 mGy-cm IV Contrast:  100 mL of iohexol (OMNIPAQUE) Enteric Contrast:  Enteric contrast was not administered  FINDINGS: Exam limited by motion artifact   ABDOMEN LOWER CHEST:  No clinically significant abnormality identified in the visualized lower chest  LIVER/BILIARY TREE:  Unremarkable  GALLBLADDER: No calcified gallstones  Diffuse gallbladder wall thickening and pericholecystic edema noted  Recommend right upper quadrant ultrasound for better evaluation  No significant intrahepatic or extrahepatic biliary ductal dilatation  SPLEEN:  Unremarkable  PANCREAS:  Unremarkable  ADRENAL GLANDS:  Unremarkable  KIDNEYS/URETERS:  Unremarkable  No hydronephrosis  STOMACH AND BOWEL: Stomach is mildly distended with air and fluid  No intraluminal mass seen  Subtle diffuse wall thickening of the stomach could be due to incomplete distention or nonspecific gastritis  Small and large bowel loops appear normal in course and caliber without evidence for obstruction  Terminal ileum and appendix are unremarkable  Left colon is predominantly contracted limiting evaluation  Wall thickening of the descending and sigmoid colon could be due to under distention or focal colitis  No pericolonic free air or abscess/fluid collection  APPENDIX:  No findings to suggest appendicitis  ABDOMINOPELVIC CAVITY: Small amount of nonspecific lower pelvic free fluid noted which is pathologic in a male patient  No pneumoperitoneum  No lymphadenopathy  VESSELS:  Unremarkable for patient's age  PELVIS REPRODUCTIVE ORGANS:  Unremarkable for patient's age  URINARY BLADDER: Moderately distended and grossly unremarkable    ABDOMINAL WALL/INGUINAL REGIONS:  Unremarkable  OSSEOUS STRUCTURES:  No acute fracture or destructive osseous lesion  Impression: 1  Diffuse gallbladder wall thickening and pericholecystic edema noted without calcified gallstones  Recommend right upper quadrant ultrasound for better evaluation  No significant intrahepatic or extrahepatic biliary ductal dilatation  2   Subtle diffuse wall thickening of the stomach, descending colon, sigmoid colon, and rectum could be related to under distention versus gastritis/colitis  Recommend clinical correlation   3   No evidence for bowel "obstruction, appendicitis, obstructive uropathy, or free air  4   Small amount of nonspecific pathological free fluid in the pelvis of uncertain etiology  Workstation performed: [id number]       Incidental Findings:   1   Diffuse gallbladder wall thickening and pericholecystic edema noted without calcified gallstones   Recommend right upper quadrant ultrasound for better evaluation  No significant intrahepatic or extrahepatic biliary ductal dilatation  2   Subtle diffuse wall thickening of the stomach, descending colon, sigmoid colon, and rectum could be related to under distention versus gastritis/colitis  3   Small amount of nonspecific pathological free fluid in the pelvis of uncertain etiology  Test Results Pending at Discharge (will require follow up):   · As per After Visit Summary     Outpatient Tests Requested:  · Follow-up with PCP for monitoring of liver function  ·     Complications:  Refer to hospital course and above listed diagnosis related plan, if any    Hospital Course:     Ketty Santos is a 26 y o  male patient who originally presented to the hospital on 6/10/2023 due to ***        Please see above list of diagnoses and related plan for additional information         Condition at Discharge: stable     Discharge Day Visit / Exam:     Subjective: Feels well  Sitting up in bed eating meal  Denies any chest pain shortness of breath or dizziness  Denies any  or GI symptoms  Ambulating in room without any limiting symptoms    Discussed regarding substance abuse cessation, patient agrees to comply and reports that he has resources to follow-up if needed      Vitals: Blood Pressure: 137/86 (06/14/23 1523)  Pulse: 68 (06/14/23 1523)  Temperature: 98 °F (36 7 °C) (06/14/23 1501)  Temp Source: Temporal (06/13/23 1552)  Respirations: 17 (06/14/23 1501)  Height: 5' 11\" (180 3 cm) (06/12/23 1225)  Weight - Scale: 84 4 kg (186 lb) (06/12/23 1225)  SpO2: 100 % (06/14/23 1523)  Exam:   Physical " "Exam  Constitutional:       General: He is not in acute distress  HENT:      Head: Normocephalic and atraumatic  Cardiovascular:      Rate and Rhythm: Normal rate  Pulmonary:      Effort: Pulmonary effort is normal  No respiratory distress  Breath sounds: No wheezing, rhonchi or rales  Chest:      Chest wall: No tenderness  Abdominal:      General: Bowel sounds are normal  There is no distension  Palpations: Abdomen is soft  Tenderness: There is no abdominal tenderness  There is no guarding or rebound  Musculoskeletal:      Cervical back: Neck supple  Right lower leg: No edema  Left lower leg: No edema  Skin:     General: Skin is warm and dry  Findings: No rash  Neurological:      Mental Status: He is alert  Mental status is at baseline  Cranial Nerves: No cranial nerve deficit  Psychiatric:         Mood and Affect: Mood normal          Behavior: Behavior normal          Discharge instructions/Information to patient and family:(Discharge Medications and Follow up):   See after visit summary for information provided to patient and family  Provisions for Follow-Up Care:  See after visit summary for information related to follow-up care and any pertinent home health orders  Disposition: Home    Planned Readmission:  No     Discharge Statement:  I spent 40 minutes discharging the patient  This time was spent on the day of discharge  I had direct contact with the patient on the day of discharge  Greater than 50% of the total time was spent examining patient, answering all patient questions, arranging and discussing plan of care with patient as well as directly providing post-discharge instructions  Additional time then spent on discharge activities  Coordinated with cardiology regarding discharge recommendation  Discharge Medications:  See after visit summary for reconciled discharge medications provided to patient and family  ** Please Note:  \"This " "note has been constructed using a voice recognition system  Therefore there may be syntax, spelling, and/or grammatical errors  Please call if you have any questions   \"**      " "Home    Planned Readmission:  No     Discharge Statement:  I spent 40 minutes discharging the patient  This time was spent on the day of discharge  I had direct contact with the patient on the day of discharge  Greater than 50% of the total time was spent examining patient, answering all patient questions, arranging and discussing plan of care with patient as well as directly providing post-discharge instructions  Additional time then spent on discharge activities  Coordinated with cardiology regarding discharge recommendation  Discharge Medications:  See after visit summary for reconciled discharge medications provided to patient and family  ** Please Note: \"This note has been constructed using a voice recognition system  Therefore there may be syntax, spelling, and/or grammatical errors  Please call if you have any questions   \"**      "

## 2023-06-15 ENCOUNTER — TELEPHONE (OUTPATIENT)
Dept: CARDIOLOGY CLINIC | Facility: CLINIC | Age: 33
End: 2023-06-15

## 2023-06-15 LAB
ATRIAL RATE: 56 BPM
ATRIAL RATE: 69 BPM
P AXIS: 66 DEGREES
P AXIS: 75 DEGREES
PR INTERVAL: 146 MS
PR INTERVAL: 152 MS
QRS AXIS: 121 DEGREES
QRS AXIS: 194 DEGREES
QRSD INTERVAL: 114 MS
QRSD INTERVAL: 118 MS
QT INTERVAL: 392 MS
QT INTERVAL: 414 MS
QTC INTERVAL: 399 MS
QTC INTERVAL: 420 MS
T WAVE AXIS: 30 DEGREES
T WAVE AXIS: 55 DEGREES
VENTRICULAR RATE: 56 BPM
VENTRICULAR RATE: 69 BPM

## 2023-06-15 PROCEDURE — 93010 ELECTROCARDIOGRAM REPORT: CPT | Performed by: INTERNAL MEDICINE

## 2023-06-15 NOTE — TELEPHONE ENCOUNTER
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

## 2023-06-15 NOTE — UTILIZATION REVIEW
Attention Clinical Reviewer: This patient is currently a prisoner  Initial Clinical Review    Admission: Date/Time/Statement:   Admission Orders (From admission, onward)     Ordered        06/10/23 0620  INPATIENT ADMISSION  Once                      Orders Placed This Encounter   Procedures   • INPATIENT ADMISSION     Standing Status:   Standing     Number of Occurrences:   1     Order Specific Question:   Level of Care     Answer:   Med Surg [16]     Order Specific Question:   Estimated length of stay     Answer:   More than 2 Midnights     Order Specific Question:   Certification     Answer:   I certify that inpatient services are medically necessary for this patient for a duration of greater than two midnights  See H&P and MD Progress Notes for additional information about the patient's course of treatment  ED Arrival Information     Expected   -    Arrival   6/10/2023 02:16    Acuity   Urgent            Means of arrival   Walk-In    Escorted by   [place]    Service   Hospitalist    Admission type   Emergency            Arrival complaint   Chest pain, SOB           Chief Complaint   Patient presents with   • Chest Pain     Patient c/o chest pain and sob after doing meth, has scratch marks from self on left side abdomen, patient feels very out of breath and can't give detailed information       Initial Presentation: 26 y o  male [facility] ed [facility] jail  for evaluation and treatment of chest pain with shortness of breath  Clinical assessment significant for temp 97,2m hypertension and tachypnea  UDS : +amh/meth, +THC, D-dimer 0 51, Trop 543, , K 2 9, An gap 18, CR1 33, WBC 18 84  Imaging shows diffuse gallbladder wall thickening  EKG sinus bradycardia  Initially treated with iv  9% ns bolus, aspirin, nitrobid x1, iv heparin gtt, iv ceftriaxone  Admit to inpatient med surg for treatment of chest pain, gallbladder thickening, possible gastritis    Plan to consult cardiology, trend troponin, Echo, telemetry, aspirin, atrovstatin, heparin gtt, CTA to rule out dissection, trend electrolytes, iv antibiotics  Date: 6-11-23   Day 2: inpatient med surg    Cardiology consult completed  NSTEMI, No acute ischemic changes on EKG  Continue aspirin, iv heparin gtt  Monitor on telemetry-nsr  Troponin 985 > 535  CTA with diffuse gallbladder wall thickening  No dissection  Continue iv ceftriaxone, iv flagyl  Iv  9% ns 125/hr  ED Triage Vitals   06/10/23 0242 06/10/23 0222 06/10/23 0222 06/10/23 0222 06/10/23 0222   (!) 97 2°F   (36 2 °C) 64 (!) 24 169/93 99 %      Tympanic Monitor         Pain Score       4          06/10/23 84 4 kg (186 lb)     Additional Vital Signs:     Patient Vitals for the past 24 hrs:   BP Temp Pulse Resp SpO2   06/14/23 1935 127/82 98 3 °F (36 8 °C) 70 20 98 %   06/14/23 1523 -- -- -- -- 100 %   06/14/23 1523 137/86 -- 68 -- 98 %   06/14/23 1520 124/69 -- -- -- --   06/14/23 1502 (!) 100/48 -- 61 -- 100 %   06/14/23 1501 (!) 100/48 98 °F (36 7 °C) 58 17 100 %   06/14/23 0846 (!) 105/44 -- 74 -- 99 %     Pertinent Labs/Diagnostic Test Results:     ECHO  6-10-23     •  Left Ventricle: Left ventricular cavity size is normal  Wall thickness is normal  Systolic function is normal   Estimated ejection fraction is 50-55%  Although no diagnostic regional wall motion abnormality was identified, this possibility cannot be completely excluded on the basis of this study  Diastolic function is normal   •  Right Ventricle: Right ventricular cavity size is mildly dilated  Systolic function is normal   •  Right Atrium: The atrium is mildly dilated  •  Mitral Valve: There is mild regurgitation  •  Tricuspid Valve: There is trace regurgitation  •  Pulmonic Valve: There is trace regurgitation      CTA dissection protocol chest/abdomen/pelvis   Final  (06/10 7845)      Trace amount of complex abnormal fluid in the pelvis for a male patient could relate to infectious/inflammatory process or other etiology, correlate clinically  No evidence of intramural hematoma, dissection or aneurysm  Persistent diffuse gallbladder wall thickening as seen on ultrasound  US right upper quadrant   Final (06/10 1053)      Diffuse gallbladder wall thickening similar to CT  Ahmadi's sign is negative as patient is medicated  Acute cholecystitis is still in the differential and further management based on clinical criteria  Workstation performed: [id number]         CT abdomen pelvis with contrast   Final  (06/10 0610)      1  Diffuse gallbladder wall thickening and pericholecystic edema noted without calcified gallstones  Recommend right upper quadrant ultrasound for better evaluation  No significant intrahepatic or extrahepatic biliary ductal dilatation  2   Subtle diffuse wall thickening of the stomach, descending colon, sigmoid colon, and rectum could be related to under distention versus gastritis/colitis  Recommend clinical correlation  3   No evidence for bowel obstruction, appendicitis, obstructive uropathy, or free air  4   Small amount of nonspecific pathological free fluid in the pelvis of uncertain etiology  XR chest 1 view portable   Final  (06/10 0729)      No acute cardiopulmonary disease  Findings are stable            Results from last 7 days   Lab Units 06/14/23  0502 06/13/23  0458 06/12/23  0556 06/11/23  0419 06/10/23  0228   HEMATOCRIT % 37 2 37 3 43 3 42 2 43 5   HEMOGLOBIN g/dL 12 9 12 7 14 6 14 8 15 4   NEUTROS ABS Thousands/µL  --   --   --   --  16 47*   PLATELETS Thousands/uL 148* 154 167 191 227   WBC Thousand/uL 6 18 6 23 9 58 14  05* 18 84*         Results from last 7 days   Lab Units 06/14/23  0502 06/13/23  0458 06/12/23  0556 06/11/23  0419 06/10/23  0228   ANION GAP mmol/L 5 5 7 10 18*   BUN mg/dL 14 14 14 13 25   CALCIUM mg/dL 8 8 8 4 8 5 8 9 9 8   CHLORIDE mmol/L 107 109* 109* 104 95*   CO2 mmol/L 26 23 19* 20* 19*   CREATININE mg/dL 1 09 1 08 1 05 1 17 1 33*   EGFR ml/min/1 73sq m 87 89 92 80 69   POTASSIUM mmol/L 3 8 4 1 4 3 3 7 2 9*   MAGNESIUM mg/dL 1 8*  --   --  2 0 1 7*   SODIUM mmol/L 138 137 135 134* 132*     Results from last 7 days   Lab Units 06/14/23  0502 06/13/23  0458 06/12/23  0556 06/11/23  0419 06/10/23  0228   ALBUMIN g/dL 3 8 3 5 3 9 4 3 5 1*   ALK PHOS U/L 36 34 39 44 42   ALT U/L 80* 35 23 26 31   AST U/L 94* 37 30 41* 45*   BILIRUBIN DIRECT mg/dL  --   --   --   --  0 14   TOTAL BILIRUBIN mg/dL 0 60 0 82 1 14* 1 44* 0 72   TOTAL PROTEIN g/dL 6 2* 5 7* 6 4 7 0 8 1         Results from last 7 days   Lab Units 06/14/23  0502 06/13/23  0458 06/12/23  0556 06/11/23  0419 06/10/23  0228   GLUCOSE RANDOM mg/dL 86 89 87 98 156*         Results from last 7 days   Lab Units 06/10/23  0228   EAG mg/dl 100   HEMOGLOBIN A1C % 5 1     BETA-HYDROXYBUTYRATE   Date Value Ref Range Status   06/10/2023 0 1 <0 6 mmol/L Final        Results from last 7 days   Lab Units 06/11/23  0419 06/10/23  0228   CK TOTAL U/L 535* 989*     Results from last 7 days   Lab Units 06/10/23  0632 06/10/23  0437 06/10/23  0228   HS TNI 0HR ng/L  --   --  543*   HS TNI 2HR ng/L  --  1,219*  --    HS TNI 4HR ng/L 2,094*  --   --    HSTNI D2 ng/L  --  676*  --    HSTNI D4 ng/L 1,551*  --   --      Results from last 7 days   Lab Units 06/10/23  0228   D-DIMER QUANTITATIVE ug/ml FEU 0 51*     Results from last 7 days   Lab Units 06/13/23  0458 06/12/23  2214 06/12/23  1119 06/10/23  1409 06/10/23  0228   INR   --   --   --   --  1 13   PROTIME seconds  --   --   --   --  14 6*   PTT seconds 84* 105* 94*   < > 28    < > = values in this interval not displayed       Results from last 7 days   Lab Units 06/14/23  0502   TSH 3RD GENERATON uIU/mL 1 878     Results from last 7 days   Lab Units 06/10/23  0228   PROCALCITONIN ng/ml <0 05     Results from last 7 days   Lab Units 06/10/23  1805 06/10/23  1409   LACTIC ACID mmol/L 1 4 3 8*       Results from last 7 days   Lab Units 06/10/23  1409 BNP pg/mL 330*         Results from last 7 days   Lab Units 06/10/23  0310   BACTERIA UA /hpf Occasional   BILIRUBIN UA  Negative   BLOOD UA  Negative   CLARITY UA  Clear   COLOR UA  Yellow   EPITHELIAL CELLS WET PREP /hpf Occasional   GLUCOSE UA mg/dl Negative   KETONES UA mg/dl 15 (1+)*   LEUKOCYTES UA  Negative   NITRITE UA  Negative   PH UA  7 0   PROTEIN UA mg/dl 100 (2+)*   RBC UA /hpf 0-1   SPEC GRAV UA  1 015   UROBILINOGEN UA E U /dl 0 2   WBC UA /hpf 0-1       Results from last 7 days   Lab Units 06/10/23  0310   AMPH/METH  Positive*   BARBITURATE UR  Negative   BENZODIAZEPINE UR  Negative   COCAINE UR  Negative   METHADONE URINE  Negative   OPIATE UR  Negative   PCP UR  Negative   THC UR  Positive*     Results from last 7 days   Lab Units 06/10/23  1409   ETHANOL LVL mg/dL <10       ED Treatment:   Medication Administration from 06/10/2023 0215 to 06/10/2023 0751       Date/Time Order Dose Route Action     06/10/2023 0239 EDT sodium chloride 0 9 % bolus 1,000 mL 1,000 mL Intravenous New Bag     06/10/2023 0330 EDT aspirin chewable tablet 324 mg 324 mg Oral Given     06/10/2023 0331 EDT nitroglycerin (NITRO-BID) 2 % TD ointment 1 inch 1 inch Topical Given     06/10/2023 0333 EDT midazolam (VERSED) injection 2 mg 2 mg Intravenous Given     06/10/2023 0558 EDT heparin (porcine) injection 4,000 Units 4,000 Units Intravenous Given     06/10/2023 0606 EDT heparin (porcine) 25,000 units in 0 45% NaCl 250 mL infusion (premix) 12 Units/kg/hr Intravenous New Bag     06/10/2023 0650 EDT cefTRIAXone (ROCEPHIN) IVPB (premix in dextrose) 1,000 mg 50 mL 1,000 mg Intravenous New Bag        History reviewed  No pertinent past medical history      Present on Admission:  none      Admitting Diagnosis:     Chest pain [R07 9]  Abdominal pain [R10 9]  Methamphetamine abuse (HCC) [F15 10]  NSTEMI (non-ST elevated myocardial infarction) ([facility] [address] ) [I21 4]    Age/Sex: 26 y o  male    Scheduled Medications:    No current facility-administered medications for this encounter  Continuous IV Infusions:  No current facility-administered medications for this encounter  PRN Meds:  No current facility-administered medications for this encounter  IP CONSULT TO CARDIOLOGY  IP CONSULT TO PSYCHIATRY    Network Utilization Review Department  ATTENTION: Please call with any questions or concerns to [phone] and carefully listen to the prompts so that you are directed to the right person  All voicemails are confidential   Percy Caba all requests for admission clinical reviews, approved or denied determinations and any other requests to dedicated fax number below belonging to the campus where the patient is receiving treatment   List of dedicated fax numbers for the Facilities:  [address] DENIALS (Administrative/Medical Necessity) [phone]   [address] (Maternity/NICU/Pediatrics) [phone]   4 Kassandra Bateman [phone]   Risa Kelly  [phone]   [address] [place] [address] [phone]   [facility] [address] [place] [address] [facility] [phone]

## 2023-06-16 LAB — BACTERIA BLD CULT: NORMAL

## 2023-06-16 NOTE — UTILIZATION REVIEW
NOTIFICATION OF ADMISSION DISCHARGE   This is a Notification of Discharge from [facility]  Please be advised that this patient has been discharge from our facility  Below you will find the admission and discharge date and time including the patient’s disposition  UTILIZATION REVIEW CONTACT:  Oriana Strickland  Utilization   Network Utilization Review Department  Phone: [phone] x carefully listen to the prompts  All voicemails are confidential   Email: [email or web address]     ADMISSION INFORMATION  PRESENTATION DATE: 6/10/2023  2:18 AM  OBERVATION ADMISSION DATE:   INPATIENT ADMISSION DATE: 6/10/23  6:20 AM   DISCHARGE DATE: 6/14/2023  7:55 PM   DISPOSITION:Home/Self Care    IMPORTANT INFORMATION:  Send all requests for admission clinical reviews, approved or denied determinations and any other requests to dedicated fax number below belonging to the campus where the patient is receiving treatment   List of dedicated fax numbers:  [address] DENIALS (Administrative/Medical Necessity) [phone]   [address] (Maternity/NICU/Pediatrics) [phone]   [facility] [phone]   [place] [address] [phone]   [address] [phone]   [address] [facility] [phone] 90 [facility] [phone]   [address] [place] [address] 119 [phone]   [facility]  [phone]   [facility] [phone]   [address] [facility] [phone]

## 2023-06-21 PROBLEM — R74.8 ELEVATED CK: Status: RESOLVED | Noted: 2023-06-10 | Resolved: 2023-06-21

## 2023-06-21 PROBLEM — R07.9 CHEST PAIN: Status: RESOLVED | Noted: 2023-06-10 | Resolved: 2023-06-21

## 2023-06-21 PROBLEM — E87.8 ELECTROLYTE ABNORMALITY: Status: RESOLVED | Noted: 2023-06-10 | Resolved: 2023-06-21

## 2023-06-21 PROBLEM — R73.09 ELEVATED GLUCOSE: Status: RESOLVED | Noted: 2023-06-10 | Resolved: 2023-06-21
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
## 2023-10-22 ENCOUNTER — HOSPITAL ENCOUNTER (EMERGENCY)
Facility: HOSPITAL | Age: 33
Discharge: HOME | End: 2023-10-22
Attending: EMERGENCY MEDICINE | Admitting: EMERGENCY MEDICINE
Payer: COMMERCIAL

## 2023-10-22 VITALS
WEIGHT: 171.3 LBS | BODY MASS INDEX: 23.89 KG/M2 | SYSTOLIC BLOOD PRESSURE: 157 MMHG | RESPIRATION RATE: 22 BRPM | DIASTOLIC BLOOD PRESSURE: 80 MMHG | OXYGEN SATURATION: 98 % | HEART RATE: 71 BPM

## 2023-10-22 DIAGNOSIS — F12.10 MARIJUANA ABUSE: ICD-10-CM

## 2023-10-22 DIAGNOSIS — R07.9 CHEST PAIN, UNSPECIFIED: Primary | ICD-10-CM

## 2023-10-22 DIAGNOSIS — F15.10 METHAMPHETAMINE ABUSE (HCC): ICD-10-CM

## 2023-10-22 DIAGNOSIS — F41.9 ANXIETY: ICD-10-CM

## 2023-10-22 LAB
ATRIAL RATE: 58 BPM
P AXIS: 33 DEGREES
PR INTERVAL: 136 MS
QRS AXIS: 94 DEGREES
QRSD INTERVAL: 128 MS
QT INTERVAL: 394 MS
QTC INTERVAL: 386 MS
T WAVE AXIS: 41 DEGREES
VENTRICULAR RATE: 58 BPM

## 2023-10-22 PROCEDURE — 93010 ELECTROCARDIOGRAM REPORT: CPT | Performed by: INTERNAL MEDICINE

## 2023-10-22 PROCEDURE — 99285 EMERGENCY DEPT VISIT HI MDM: CPT

## 2023-10-22 PROCEDURE — 93005 ELECTROCARDIOGRAM TRACING: CPT

## 2023-10-22 PROCEDURE — 99284 EMERGENCY DEPT VISIT MOD MDM: CPT | Performed by: EMERGENCY MEDICINE
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
## 2023-10-22 NOTE — ED PROVIDER NOTES
History  Chief Complaint   Patient presents with    Chest Pain     Pt reported left side chest pain started couple weeks ago, worse tonight. C/o pain in left arm as well as left leg. Numbness in left hand     66-year-old male with past history of anxiety, methamphetamine abuse, marijuana abuse, presents to the ED for evaluation of left arm pain and intermittent left-sided chest pain over the past 3 to 4 days. Appears to be very anxious. Patient's significant other is at bedside who states that any stressors increases patient's symptoms. Patient thinks he may have had a heart attack. When I asked patient about his substance use patient states that he smoked methamphetamine earlier today. History provided by:  Patient  Chest Pain  Associated symptoms: no abdominal pain, no back pain, no cough, no fever, no palpitations, no shortness of breath and not vomiting        Prior to Admission Medications   Prescriptions Last Dose Informant Patient Reported? Taking?   bacitracin topical ointment 500 units/g topical ointment Not Taking  No No   Sig: Apply 1 large application topically 2 (two) times a day   Patient not taking: Reported on 10/22/2023   magnesium Oxide (MAG-OX) 400 mg TABS Not Taking  No No   Sig: Take 1 tablet (400 mg total) by mouth daily Do not start before Mae 15, 2023. Patient not taking: Reported on 10/22/2023      Facility-Administered Medications: None       History reviewed. No pertinent past medical history. Past Surgical History:   Procedure Laterality Date    CARDIAC CATHETERIZATION N/A 6/13/2023    Procedure: Cardiac catheterization;  Surgeon: Mary Kay Williamson MD;  Location: [address] [place] [address] CATH LAB; Service: Cardiology    CARDIAC CATHETERIZATION Left 6/13/2023    Procedure: Cardiac Left Ventriculogram;  Surgeon: Mary Kay Williamson MD;  Location: [address] [place] [address] CATH LAB;   Service: Cardiology    CARDIAC CATHETERIZATION N/A 6/13/2023    Procedure: Cardiac Coronary Angiogram;  Surgeon: Mary Kay Williamson MD; Location: [address] [place] [address] CATH LAB; Service: Cardiology       Family History   Problem Relation Age of Onset    No Known Problems Mother     No Known Problems Father     No Known Problems Sister     No Known Problems Brother      I have reviewed and agree with the history as documented. E-Cigarette/Vaping     E-Cigarette/Vaping Substances    Nicotine No     THC Yes     CBD No     Flavoring No     Other No     Unknown No      Social History     Tobacco Use    Smoking status: Former     Packs/day: 1.00     Years: 10.00     Total pack years: 10.00     Types: Cigarettes    Tobacco comments:     refused teaching    Substance Use Topics    Alcohol use: Not Currently     Comment: drinks occasional throughout week     Drug use: Yes     Types: Marijuana, Methamphetamines     Comment: "once in awile"       Review of Systems   Constitutional:  Negative for chills and fever. HENT:  Negative for ear pain and sore throat. Eyes:  Negative for pain and visual disturbance. Respiratory:  Negative for cough and shortness of breath. Cardiovascular:  Positive for chest pain. Negative for palpitations. Gastrointestinal:  Negative for abdominal pain and vomiting. Genitourinary:  Negative for dysuria and hematuria. Musculoskeletal:  Positive for myalgias. Negative for arthralgias and back pain. Skin:  Negative for color change and rash. Neurological:  Negative for seizures and syncope. All other systems reviewed and are negative.       Physical Exam  Physical Exam    Vital Signs  ED Triage Vitals [10/22/23 0554]   Temp Pulse Respirations Blood Pressure SpO2   -- 71 22 157/80 98 %      Temp src Heart Rate Source Patient Position - Orthostatic VS BP Location FiO2 (%)   -- Monitor Sitting Right arm --      Pain Score       10 - Worst Possible Pain           Vitals:    10/22/23 0554   BP: 157/80   Pulse: 71   Patient Position - Orthostatic VS: Sitting         Visual Acuity      ED Medications  Medications - No data to display    Diagnostic Studies  Results Reviewed       None                   No orders to display              Procedures  ECG 12 Lead Documentation Only    Date/Time: 10/22/2023 5:59 AM    Performed by: Rosibel Becker DO  Authorized by: Rosibel Becker DO    Indications / Diagnosis:  Chest pain  ECG reviewed by me, the ED Provider: yes    Patient location:  ED  Previous ECG:     Previous ECG:  Compared to current    Similarity:  Changes noted  Interpretation:     Interpretation: abnormal    Comments:      Sinus rhythm, rate 58, normal axis, widened QRS pattern noted, right bundle branch block pattern noted, no acute ST/T wave abnormalities noted,            ED Course                                             Medical Decision Making  Patient with history of of polysubstance abuse. Patient used methamphetamine earlier today. Patient smokes methamphetamine as well as marijuana. Patient has been having ongoing intermittent chest discomfort and left upper arm pain over the past 4 to 5 days. Patient is extremely anxious according to significant other. Patient thinks he may be having a heart attack. When I went to further inquire about patient's drug use patient became very defensive and angry. Patient then walked out of the emergency department. Patient's significant other feels overwhelmed. She states that she is always telling him to stop using drugs however he will not listen. He does have extreme anxiety. I did tell patient's significant other that his EKG was at baseline and there is no acute ST elevations however we will need to do blood work for further evaluation of his chest pain as well as chest x-ray. I told significant other to bring patient back with any worsening concerns.              Disposition  Final diagnoses:   Chest pain, unspecified   Anxiety   Methamphetamine abuse ([address])   Marijuana abuse     Time reflects when diagnosis was documented in both MDM as applicable and the Disposition within this note       Time User Action Codes Description Comment    10/22/2023  6:21 AM Art Pretty Add [R07.9] Chest pain, unspecified     10/22/2023  6:21 AM Art Pretty Add [F41.9] Anxiety     10/22/2023  6:21 AM Mitchell Hanks Swati Add [F15.10] Methamphetamine abuse ([address])     10/22/2023  6:21 AM Art Pretty Add [F12.10] Marijuana abuse           ED Disposition       ED Disposition   Left from Room after Provider Exam    Condition   --    Date/Time   Sun Oct 22, 2023  6:21 AM    Comment   --             Follow-up Information    None         Patient's Medications   Discharge Prescriptions    No medications on file       No discharge procedures on file.     PDMP Review       None            ED Provider  Electronically Signed by             Landen Anderson DO  10/22/23 2119

## 2024-06-10 ENCOUNTER — HOSPITAL ENCOUNTER (EMERGENCY)
Facility: HOSPITAL | Age: 34
Discharge: HOME | End: 2024-06-10
Attending: EMERGENCY MEDICINE | Admitting: EMERGENCY MEDICINE
Payer: COMMERCIAL

## 2024-06-10 VITALS
RESPIRATION RATE: 16 BRPM | HEART RATE: 90 BPM | DIASTOLIC BLOOD PRESSURE: 68 MMHG | SYSTOLIC BLOOD PRESSURE: 133 MMHG | OXYGEN SATURATION: 100 % | TEMPERATURE: 98.6 F

## 2024-06-10 DIAGNOSIS — H10.213 CHEMICAL CONJUNCTIVITIS OF BOTH EYES: Primary | ICD-10-CM

## 2024-06-10 PROCEDURE — 99284 EMERGENCY DEPT VISIT MOD MDM: CPT | Performed by: EMERGENCY MEDICINE

## 2024-06-10 PROCEDURE — 99283 EMERGENCY DEPT VISIT LOW MDM: CPT

## 2024-06-11 NOTE — ED PROVIDER NOTES
History  Chief Complaint   Patient presents with    Chemical Exposure     Pt brought in by police and ambulance, per police pt under arrest and was pepper-sprayed during the struggle, here for medical clearance.      Patient brought in by police for medical clearance for nursing home.  Turned to struggle with police patient was pepper sprayed.  Patient was irrigated with a hose and additional fluid on arrival.  No other complaints at this time.      History provided by:  Patient and police   used: No        Prior to Admission Medications   Prescriptions Last Dose Informant Patient Reported? Taking?   bacitracin topical ointment 500 units/g topical ointment   No No   Sig: Apply 1 large application topically 2 (two) times a day   Patient not taking: Reported on 10/22/2023   magnesium Oxide (MAG-OX) 400 mg TABS   No No   Sig: Take 1 tablet (400 mg total) by mouth daily Do not start before Mae 15, 2023.   Patient not taking: Reported on 10/22/2023      Facility-Administered Medications: None       History reviewed. No pertinent past medical history.    Past Surgical History:   Procedure Laterality Date    CARDIAC CATHETERIZATION N/A 6/13/2023    Procedure: Cardiac catheterization;  Surgeon: Donn Bowens MD;  Location: [facility];  Service: Cardiology    CARDIAC CATHETERIZATION Left 6/13/2023    Procedure: Cardiac Left Ventriculogram;  Surgeon: Donn Bowens MD;  Location: [facility];  Service: Cardiology    CARDIAC CATHETERIZATION N/A 6/13/2023    Procedure: Cardiac Coronary Angiogram;  Surgeon: Donn Bowens MD;  Location: [facility];  Service: Cardiology       Family History   Problem Relation Age of Onset    No Known Problems Mother     No Known Problems Father     No Known Problems Sister     No Known Problems Brother      I have reviewed and agree with the history as documented.    E-Cigarette/Vaping     E-Cigarette/Vaping Substances    Nicotine No     THC Yes     CBD No      "Flavoring No     Other No     Unknown No      Social History     Tobacco Use    Smoking status: Former     Current packs/day: 1.00     Average packs/day: 1 pack/day for 10.0 years (10.0 ttl pk-yrs)     Types: Cigarettes    Tobacco comments:     refused teaching    Substance Use Topics    Alcohol use: Not Currently     Comment: drinks occasional throughout week     Drug use: Yes     Types: Marijuana, Methamphetamines     Comment: \"once in awile\"       Review of Systems   Eyes:  Positive for pain and redness.   All other systems reviewed and are negative.      Physical Exam  Physical Exam  Vitals and nursing note reviewed.   Constitutional:       General: He is not in acute distress.  Eyes:      Extraocular Movements: Extraocular movements intact.      Pupils: Pupils are equal, round, and reactive to light.      Comments: Conjunctiva injected bilaterally   Cardiovascular:      Rate and Rhythm: Normal rate and regular rhythm.   Pulmonary:      Effort: Pulmonary effort is normal. No respiratory distress.      Breath sounds: Normal breath sounds.   Neurological:      General: No focal deficit present.      Mental Status: He is alert and oriented to person, place, and time.         Vital Signs  ED Triage Vitals [06/10/24 1858]   Temperature Pulse Respirations Blood Pressure SpO2   98.6 °F (37 °C) 90 16 133/68 100 %      Temp Source Heart Rate Source Patient Position - Orthostatic VS BP Location FiO2 (%)   Oral Monitor Sitting Right arm --      Pain Score       --           Vitals:    06/10/24 1858   BP: 133/68   Pulse: 90   Patient Position - Orthostatic VS: Sitting         Visual Acuity      ED Medications  Medications - No data to display    Diagnostic Studies  Results Reviewed       None                   No orders to display              Procedures  Procedures         ED Course                                             Medical Decision Making  Pulse ox 100% on room air indicating adequate oxygenation.               "     Disposition  Final diagnoses:   Chemical conjunctivitis of both eyes     Time reflects when diagnosis was documented in both MDM as applicable and the Disposition within this note       Time User Action Codes Description Comment    6/10/2024  7:24 PM Colton Montilla Add [H10.213] Chemical conjunctivitis of both eyes           ED Disposition       ED Disposition   Discharge    Condition   Stable    Date/Time   Mon Power 10, 2024  7:24 PM    Comment   Nacho Waller discharge to home/self care.                   Follow-up Information       Follow up With Specialties Details Why Contact Info    [employer]   As needed [phone]              Discharge Medication List as of 6/10/2024  7:24 PM        CONTINUE these medications which have NOT CHANGED    Details   bacitracin topical ointment 500 units/g topical ointment Apply 1 large application topically 2 (two) times a day, Starting Fri 11/26/2021, Normal      magnesium Oxide (MAG-OX) 400 mg TABS Take 1 tablet (400 mg total) by mouth daily Do not start before Mae 15, 2023., Starting u 6/15/2023, Normal             No discharge procedures on file.    PDMP Review       None            ED Provider  Electronically Signed by             Colton Montilla DO  06/10/24 2018

## (undated) DEVICE — MANIFOLD KIT NETWORK - CCL

## (undated) DEVICE — RADIFOCUS OPTITORQUE ANGIOGRAPHIC CATHETER: Brand: OPTITORQUE

## (undated) DEVICE — RADIAL/BRACHIAL DRAPE: Brand: CONVERTORS

## (undated) DEVICE — CATH DIAG 5FR IMPULSE 110CM 6S PIG 145 ANG

## (undated) DEVICE — THE VASC BAND HEMOSTAT IS A COMPRESSION DEVICE TO ASSIST HEMOSTASIS OF ARTERIAL, VENOUS AND HEMODIALYSIS PERCUTANEOUS ACCESS SITES.: Brand: VASC BAND™ HEMOSTAT

## (undated) DEVICE — GLIDESHEATH SLENDER STAINLESS STEEL KIT: Brand: GLIDESHEATH SLENDER

## (undated) DEVICE — GUIDEWIRE .035 260CM 3MM J TIP

## (undated) DEVICE — Device: Brand: ASAHI SILVERWAY

## (undated) DEVICE — NEEDLE PERCUTANEOUS 21G X 4CM